# Patient Record
Sex: FEMALE | Race: WHITE | Employment: FULL TIME | URBAN - METROPOLITAN AREA
[De-identification: names, ages, dates, MRNs, and addresses within clinical notes are randomized per-mention and may not be internally consistent; named-entity substitution may affect disease eponyms.]

---

## 2017-08-22 ENCOUNTER — HOSPITAL ENCOUNTER (OUTPATIENT)
Dept: MAMMOGRAPHY | Age: 52
Discharge: HOME | End: 2017-08-22
Attending: OBSTETRICS & GYNECOLOGY
Payer: COMMERCIAL

## 2017-08-22 DIAGNOSIS — Z12.39 SCREENING FOR MALIGNANT NEOPLASM OF BREAST: ICD-10-CM

## 2017-08-22 PROCEDURE — 77067 SCR MAMMO BI INCL CAD: CPT

## 2017-08-29 ENCOUNTER — HOSPITAL ENCOUNTER (OUTPATIENT)
Dept: MAMMOGRAPHY | Age: 52
Discharge: HOME | End: 2017-08-29
Attending: OBSTETRICS & GYNECOLOGY
Payer: COMMERCIAL

## 2017-08-29 DIAGNOSIS — R92.8 ABNORMAL MAMMOGRAM: ICD-10-CM

## 2017-08-29 PROCEDURE — 76642 ULTRASOUND BREAST LIMITED: CPT

## 2019-05-22 ENCOUNTER — HOSPITAL ENCOUNTER (OUTPATIENT)
Dept: MAMMOGRAPHY | Age: 54
Discharge: HOME | End: 2019-05-22
Attending: OBSTETRICS & GYNECOLOGY
Payer: COMMERCIAL

## 2019-05-22 DIAGNOSIS — Z12.31 VISIT FOR SCREENING MAMMOGRAM: ICD-10-CM

## 2019-05-22 PROCEDURE — 77063 BREAST TOMOSYNTHESIS BI: CPT

## 2024-07-09 ENCOUNTER — HOSPITAL ENCOUNTER (INPATIENT)
Age: 59
LOS: 1 days | Discharge: ANOTHER HOSPITAL | DRG: 862 | End: 2024-07-09
Attending: STUDENT IN AN ORGANIZED HEALTH CARE EDUCATION/TRAINING PROGRAM | Admitting: HOSPITALIST
Payer: COMMERCIAL

## 2024-07-09 ENCOUNTER — APPOINTMENT (OUTPATIENT)
Dept: CT IMAGING | Age: 59
DRG: 862 | End: 2024-07-09
Payer: COMMERCIAL

## 2024-07-09 VITALS
TEMPERATURE: 98.2 F | HEIGHT: 61 IN | HEART RATE: 84 BPM | OXYGEN SATURATION: 98 % | DIASTOLIC BLOOD PRESSURE: 86 MMHG | RESPIRATION RATE: 16 BRPM | BODY MASS INDEX: 27.94 KG/M2 | WEIGHT: 148 LBS | SYSTOLIC BLOOD PRESSURE: 127 MMHG

## 2024-07-09 DIAGNOSIS — T81.44XA SEPSIS FOLLOWING PROCEDURE, INITIAL ENCOUNTER (HCC): ICD-10-CM

## 2024-07-09 DIAGNOSIS — A41.9 SEPTICEMIA (HCC): Primary | ICD-10-CM

## 2024-07-09 PROBLEM — T81.49XA POSTOPERATIVE ABSCESS: Status: ACTIVE | Noted: 2024-07-09

## 2024-07-09 PROBLEM — K35.33 APPENDICULAR ABSCESS: Status: RESOLVED | Noted: 2024-07-09 | Resolved: 2024-07-09

## 2024-07-09 PROBLEM — K35.33 APPENDICULAR ABSCESS: Status: ACTIVE | Noted: 2024-07-09

## 2024-07-09 LAB
ALBUMIN SERPL-MCNC: 3.6 G/DL (ref 3.5–5)
ALBUMIN/GLOB SERPL: 1 (ref 1–1.9)
ALP SERPL-CCNC: 130 U/L (ref 35–104)
ALT SERPL-CCNC: 19 U/L (ref 12–65)
ANION GAP SERPL CALC-SCNC: 19 MMOL/L (ref 9–18)
AST SERPL-CCNC: 18 U/L (ref 15–37)
BASOPHILS # BLD: 0.1 K/UL (ref 0–0.2)
BASOPHILS NFR BLD: 0 % (ref 0–2)
BILIRUB SERPL-MCNC: 0.7 MG/DL (ref 0–1.2)
BILIRUB UR QL: NEGATIVE
BUN SERPL-MCNC: 10 MG/DL (ref 6–23)
CALCIUM SERPL-MCNC: 9.4 MG/DL (ref 8.8–10.2)
CHLORIDE SERPL-SCNC: 98 MMOL/L (ref 98–107)
CO2 SERPL-SCNC: 21 MMOL/L (ref 20–28)
CREAT SERPL-MCNC: 0.65 MG/DL (ref 0.6–1.1)
DIFFERENTIAL METHOD BLD: ABNORMAL
EOSINOPHIL # BLD: 0.1 K/UL (ref 0–0.8)
EOSINOPHIL NFR BLD: 1 % (ref 0.5–7.8)
ERYTHROCYTE [DISTWIDTH] IN BLOOD BY AUTOMATED COUNT: 11.8 % (ref 11.9–14.6)
GLOBULIN SER CALC-MCNC: 3.7 G/DL (ref 2.3–3.5)
GLUCOSE SERPL-MCNC: 89 MG/DL (ref 70–99)
GLUCOSE UR QL STRIP.AUTO: NEGATIVE MG/DL
HCT VFR BLD AUTO: 37.8 % (ref 35.8–46.3)
HGB BLD-MCNC: 13.1 G/DL (ref 11.7–15.4)
IMM GRANULOCYTES # BLD AUTO: 0.1 K/UL (ref 0–0.5)
IMM GRANULOCYTES NFR BLD AUTO: 1 % (ref 0–5)
KETONES UR-MCNC: 40 MG/DL
LACTATE SERPL-SCNC: 0.9 MMOL/L (ref 0.5–2)
LEUKOCYTE ESTERASE UR QL STRIP: NEGATIVE
LYMPHOCYTES # BLD: 1.6 K/UL (ref 0.5–4.6)
LYMPHOCYTES NFR BLD: 11 % (ref 13–44)
MCH RBC QN AUTO: 29 PG (ref 26.1–32.9)
MCHC RBC AUTO-ENTMCNC: 34.7 G/DL (ref 31.4–35)
MCV RBC AUTO: 83.8 FL (ref 82–102)
MONOCYTES # BLD: 0.8 K/UL (ref 0.1–1.3)
MONOCYTES NFR BLD: 5 % (ref 4–12)
NEUTS SEG # BLD: 11.9 K/UL (ref 1.7–8.2)
NEUTS SEG NFR BLD: 82 % (ref 43–78)
NITRITE UR QL: NEGATIVE
NRBC # BLD: 0 K/UL (ref 0–0.2)
PH UR: 6 (ref 5–9)
PLATELET # BLD AUTO: 437 K/UL (ref 150–450)
PMV BLD AUTO: 8.9 FL (ref 9.4–12.3)
POTASSIUM SERPL-SCNC: 3.9 MMOL/L (ref 3.5–5.1)
PROCALCITONIN SERPL-MCNC: 0.06 NG/ML (ref 0–0.1)
PROT SERPL-MCNC: 7.4 G/DL (ref 6.3–8.2)
PROT UR QL: NEGATIVE MG/DL
RBC # BLD AUTO: 4.51 M/UL (ref 4.05–5.2)
RBC # UR STRIP: ABNORMAL
SERVICE CMNT-IMP: ABNORMAL
SODIUM SERPL-SCNC: 138 MMOL/L (ref 136–145)
SP GR UR: 1.01 (ref 1–1.02)
UROBILINOGEN UR QL: 0.2 EU/DL (ref 0.2–1)
WBC # BLD AUTO: 14.5 K/UL (ref 4.3–11.1)

## 2024-07-09 PROCEDURE — 83605 ASSAY OF LACTIC ACID: CPT

## 2024-07-09 PROCEDURE — 99285 EMERGENCY DEPT VISIT HI MDM: CPT

## 2024-07-09 PROCEDURE — 96365 THER/PROPH/DIAG IV INF INIT: CPT

## 2024-07-09 PROCEDURE — 87040 BLOOD CULTURE FOR BACTERIA: CPT

## 2024-07-09 PROCEDURE — 74177 CT ABD & PELVIS W/CONTRAST: CPT

## 2024-07-09 PROCEDURE — 6360000004 HC RX CONTRAST MEDICATION

## 2024-07-09 PROCEDURE — 80053 COMPREHEN METABOLIC PANEL: CPT

## 2024-07-09 PROCEDURE — 6360000002 HC RX W HCPCS

## 2024-07-09 PROCEDURE — 85025 COMPLETE CBC W/AUTO DIFF WBC: CPT

## 2024-07-09 PROCEDURE — 84145 PROCALCITONIN (PCT): CPT

## 2024-07-09 PROCEDURE — 81003 URINALYSIS AUTO W/O SCOPE: CPT

## 2024-07-09 PROCEDURE — 96366 THER/PROPH/DIAG IV INF ADDON: CPT

## 2024-07-09 PROCEDURE — 1100000000 HC RM PRIVATE

## 2024-07-09 PROCEDURE — 2580000003 HC RX 258

## 2024-07-09 RX ORDER — SODIUM CHLORIDE 9 MG/ML
INJECTION, SOLUTION INTRAVENOUS CONTINUOUS
Status: DISCONTINUED | OUTPATIENT
Start: 2024-07-09 | End: 2024-07-10

## 2024-07-09 RX ORDER — ACETAMINOPHEN 325 MG/1
650 TABLET ORAL EVERY 6 HOURS PRN
Status: DISCONTINUED | OUTPATIENT
Start: 2024-07-09 | End: 2024-07-16 | Stop reason: HOSPADM

## 2024-07-09 RX ORDER — METRONIDAZOLE 500 MG/100ML
500 INJECTION, SOLUTION INTRAVENOUS EVERY 8 HOURS
Status: DISCONTINUED | OUTPATIENT
Start: 2024-07-09 | End: 2024-07-16 | Stop reason: HOSPADM

## 2024-07-09 RX ORDER — SODIUM CHLORIDE 9 MG/ML
INJECTION, SOLUTION INTRAVENOUS PRN
Status: DISCONTINUED | OUTPATIENT
Start: 2024-07-09 | End: 2024-07-16 | Stop reason: HOSPADM

## 2024-07-09 RX ORDER — POLYETHYLENE GLYCOL 3350 17 G/17G
17 POWDER, FOR SOLUTION ORAL DAILY PRN
Status: DISCONTINUED | OUTPATIENT
Start: 2024-07-09 | End: 2024-07-16 | Stop reason: HOSPADM

## 2024-07-09 RX ORDER — POTASSIUM CHLORIDE 20 MEQ/1
40 TABLET, EXTENDED RELEASE ORAL PRN
Status: DISCONTINUED | OUTPATIENT
Start: 2024-07-09 | End: 2024-07-16 | Stop reason: HOSPADM

## 2024-07-09 RX ORDER — ONDANSETRON 4 MG/1
TABLET, FILM COATED ORAL
Status: ON HOLD | COMMUNITY
Start: 2024-06-27

## 2024-07-09 RX ORDER — SODIUM CHLORIDE 0.9 % (FLUSH) 0.9 %
5-40 SYRINGE (ML) INJECTION EVERY 12 HOURS SCHEDULED
Status: DISCONTINUED | OUTPATIENT
Start: 2024-07-09 | End: 2024-07-16 | Stop reason: HOSPADM

## 2024-07-09 RX ORDER — ONDANSETRON 2 MG/ML
4 INJECTION INTRAMUSCULAR; INTRAVENOUS EVERY 6 HOURS PRN
Status: DISCONTINUED | OUTPATIENT
Start: 2024-07-09 | End: 2024-07-16 | Stop reason: HOSPADM

## 2024-07-09 RX ORDER — ONDANSETRON 4 MG/1
4 TABLET, ORALLY DISINTEGRATING ORAL EVERY 8 HOURS PRN
Status: DISCONTINUED | OUTPATIENT
Start: 2024-07-09 | End: 2024-07-16 | Stop reason: HOSPADM

## 2024-07-09 RX ORDER — ACETAMINOPHEN 650 MG/1
650 SUPPOSITORY RECTAL EVERY 6 HOURS PRN
Status: DISCONTINUED | OUTPATIENT
Start: 2024-07-09 | End: 2024-07-16 | Stop reason: HOSPADM

## 2024-07-09 RX ORDER — MORPHINE SULFATE 4 MG/ML
4 INJECTION, SOLUTION INTRAMUSCULAR; INTRAVENOUS
Status: DISCONTINUED | OUTPATIENT
Start: 2024-07-09 | End: 2024-07-16 | Stop reason: HOSPADM

## 2024-07-09 RX ORDER — HYDROCODONE BITARTRATE AND ACETAMINOPHEN 7.5; 325 MG/1; MG/1
TABLET ORAL
Status: ON HOLD | COMMUNITY
Start: 2024-06-27

## 2024-07-09 RX ORDER — SODIUM CHLORIDE 0.9 % (FLUSH) 0.9 %
5-40 SYRINGE (ML) INJECTION PRN
Status: DISCONTINUED | OUTPATIENT
Start: 2024-07-09 | End: 2024-07-16 | Stop reason: HOSPADM

## 2024-07-09 RX ORDER — POTASSIUM CHLORIDE 7.45 MG/ML
10 INJECTION INTRAVENOUS PRN
Status: DISCONTINUED | OUTPATIENT
Start: 2024-07-09 | End: 2024-07-16 | Stop reason: HOSPADM

## 2024-07-09 RX ORDER — MAGNESIUM SULFATE IN WATER 40 MG/ML
2000 INJECTION, SOLUTION INTRAVENOUS PRN
Status: DISCONTINUED | OUTPATIENT
Start: 2024-07-09 | End: 2024-07-16 | Stop reason: HOSPADM

## 2024-07-09 RX ADMIN — VANCOMYCIN HYDROCHLORIDE 1750 MG: 10 INJECTION, POWDER, LYOPHILIZED, FOR SOLUTION INTRAVENOUS at 21:10

## 2024-07-09 RX ADMIN — IOPAMIDOL 100 ML: 755 INJECTION, SOLUTION INTRAVENOUS at 19:13

## 2024-07-09 RX ADMIN — CEFEPIME 2000 MG: 2 INJECTION, POWDER, FOR SOLUTION INTRAVENOUS at 20:56

## 2024-07-09 ASSESSMENT — LIFESTYLE VARIABLES
HOW MANY STANDARD DRINKS CONTAINING ALCOHOL DO YOU HAVE ON A TYPICAL DAY: PATIENT DOES NOT DRINK
HOW OFTEN DO YOU HAVE A DRINK CONTAINING ALCOHOL: NEVER

## 2024-07-09 ASSESSMENT — PAIN DESCRIPTION - ORIENTATION: ORIENTATION: RIGHT

## 2024-07-09 ASSESSMENT — PAIN SCALES - GENERAL: PAINLEVEL_OUTOF10: 6

## 2024-07-09 ASSESSMENT — PAIN DESCRIPTION - PAIN TYPE: TYPE: ACUTE PAIN

## 2024-07-09 ASSESSMENT — PAIN DESCRIPTION - LOCATION: LOCATION: ABDOMEN

## 2024-07-09 ASSESSMENT — PAIN DESCRIPTION - FREQUENCY: FREQUENCY: CONTINUOUS

## 2024-07-09 ASSESSMENT — PAIN - FUNCTIONAL ASSESSMENT: PAIN_FUNCTIONAL_ASSESSMENT: 0-10

## 2024-07-10 ENCOUNTER — HOSPITAL ENCOUNTER (INPATIENT)
Age: 59
LOS: 6 days | Discharge: HOME | DRG: 856 | End: 2024-07-16
Attending: HOSPITALIST | Admitting: HOSPITALIST
Payer: COMMERCIAL

## 2024-07-10 ENCOUNTER — ANESTHESIA EVENT (OUTPATIENT)
Dept: SURGERY | Age: 59
End: 2024-07-10
Payer: COMMERCIAL

## 2024-07-10 ENCOUNTER — PREP FOR PROCEDURE (OUTPATIENT)
Dept: SURGERY | Age: 59
End: 2024-07-10

## 2024-07-10 DIAGNOSIS — A41.9 SEPTICEMIA (HCC): ICD-10-CM

## 2024-07-10 PROCEDURE — 2580000003 HC RX 258: Performed by: HOSPITALIST

## 2024-07-10 PROCEDURE — 6360000002 HC RX W HCPCS: Performed by: SURGERY

## 2024-07-10 PROCEDURE — 6360000002 HC RX W HCPCS: Performed by: HOSPITALIST

## 2024-07-10 PROCEDURE — 1100000000 HC RM PRIVATE

## 2024-07-10 PROCEDURE — 99253 IP/OBS CNSLTJ NEW/EST LOW 45: CPT | Performed by: PHYSICIAN ASSISTANT

## 2024-07-10 PROCEDURE — 6370000000 HC RX 637 (ALT 250 FOR IP): Performed by: HOSPITALIST

## 2024-07-10 PROCEDURE — 87641 MR-STAPH DNA AMP PROBE: CPT

## 2024-07-10 RX ORDER — SODIUM CHLORIDE AND POTASSIUM CHLORIDE 150; 900 MG/100ML; MG/100ML
INJECTION, SOLUTION INTRAVENOUS CONTINUOUS
Status: DISCONTINUED | OUTPATIENT
Start: 2024-07-10 | End: 2024-07-15

## 2024-07-10 RX ADMIN — ACETAMINOPHEN 650 MG: 325 TABLET ORAL at 16:10

## 2024-07-10 RX ADMIN — METRONIDAZOLE 500 MG: 500 INJECTION, SOLUTION INTRAVENOUS at 23:08

## 2024-07-10 RX ADMIN — CEFEPIME 2000 MG: 2 INJECTION, POWDER, FOR SOLUTION INTRAVENOUS at 09:06

## 2024-07-10 RX ADMIN — VANCOMYCIN HYDROCHLORIDE 1000 MG: 1 INJECTION, POWDER, LYOPHILIZED, FOR SOLUTION INTRAVENOUS at 09:07

## 2024-07-10 RX ADMIN — CEFEPIME 2000 MG: 2 INJECTION, POWDER, FOR SOLUTION INTRAVENOUS at 20:06

## 2024-07-10 RX ADMIN — ONDANSETRON 4 MG: 2 INJECTION INTRAMUSCULAR; INTRAVENOUS at 17:07

## 2024-07-10 RX ADMIN — METRONIDAZOLE 500 MG: 500 INJECTION, SOLUTION INTRAVENOUS at 07:04

## 2024-07-10 RX ADMIN — METRONIDAZOLE 500 MG: 500 INJECTION, SOLUTION INTRAVENOUS at 00:21

## 2024-07-10 RX ADMIN — SODIUM CHLORIDE: 9 INJECTION, SOLUTION INTRAVENOUS at 00:19

## 2024-07-10 RX ADMIN — SODIUM CHLORIDE, PRESERVATIVE FREE 10 ML: 5 INJECTION INTRAVENOUS at 00:21

## 2024-07-10 RX ADMIN — POTASSIUM CHLORIDE AND SODIUM CHLORIDE: 900; 150 INJECTION, SOLUTION INTRAVENOUS at 23:06

## 2024-07-10 RX ADMIN — SODIUM CHLORIDE: 9 INJECTION, SOLUTION INTRAVENOUS at 14:01

## 2024-07-10 RX ADMIN — SODIUM CHLORIDE, PRESERVATIVE FREE 10 ML: 5 INJECTION INTRAVENOUS at 09:08

## 2024-07-10 RX ADMIN — METRONIDAZOLE 500 MG: 500 INJECTION, SOLUTION INTRAVENOUS at 15:10

## 2024-07-10 RX ADMIN — POTASSIUM CHLORIDE AND SODIUM CHLORIDE: 900; 150 INJECTION, SOLUTION INTRAVENOUS at 13:07

## 2024-07-10 ASSESSMENT — PAIN SCALES - GENERAL
PAINLEVEL_OUTOF10: 0
PAINLEVEL_OUTOF10: 0

## 2024-07-10 NOTE — CONSULTS
CONSULT                      Date: 7/10/2024        Patient Name: Rama Barboza     YOB: 1965      Age:  58 y.o.      History of Present Illness     58 y.o.   female  with no major medical problems presented to emergency room with abdominal pain, low-grade fevers going on for past 12 days since patient got appendectomy.  Abdominal pain is right lower quadrant, denies any radiation.  Patient reports decreased p.o. intake, lack of appetite for the past 2 weeks.  [facility] since surgery patient not completely recovered.  Reports there was a rash few days ago.  Which improved.  Laparoscopic surgical wounds are almost healed.  CT scan of abdomen was obtained, CT scan shows evidence of large abscess around 10 cm around the surgical clip.  Initially Dr. 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  ER physician contacted me to request to admit and surgeon to evaluate.    Urology consulted for right hydronephrosis. Pt not a candidate for IR drain placement but planning for laparoscopic drainage with gen surgery tomorrow. Denies flank pain.    Past Medical History     Past Medical History:   Diagnosis Date    Chronic pain     back    Fibrocystic breast         Past Surgical History     Past Surgical History:   Procedure Laterality Date    BREAST BIOPSY      BREAST LUMPECTOMY  1990?    right breast    HYSTERECTOMY (CERVIX STATUS UNKNOWN)  5/2009    Supracervical    NEUROLOGICAL SURGERY      laminectomy        Medications Prior to Admission     Prior to Admission medications    Medication Sig Start Date End Date Taking? Authorizing Provider   HYDROcodone-acetaminophen (NORCO) 7.5-325 MG per tablet TAKE 1 TAB EVERY 4 HOURS AS NEEDED FOR PAIN 6/27/24   Provider, MD Fredrick   ondansetron 
No intervention per Dr. Stefan Victor-\"patient will get an opearation\".  
[facility]  Department of Interventional Radiology  [phone]      Consult Note--No Charge     Patient: Rama Barboza MRN: [id number]  SSN: [id number]    YOB: 1965  Age: 58 y.o.  Sex: female      Referring Health Care Worker:  Dr Hathaway    Consult Date: 7/10/2024        I was asked to review the CT scan on this patient for possible percutaneous drain placement. Pertinent history includes laparoscopic appendectomy about 13 days prior.      CT 7/9/24 compared to pre-operative CT 6/26/24.      There is a multiloculated abscess deep in the right pelvis, adjacent to the staple line on the cecum.  Unfortunately, there is no safe, percutaneous route, for drain placement.  I asked my partner, Dr. Morfin to evaluate and he agrees.      I have updated Dr. Hathaway.      CLAU BAEZA MD              Past Medical History:   Diagnosis Date    Chronic pain     back    Fibrocystic breast      Past Surgical History:   Procedure Laterality Date    BREAST BIOPSY      BREAST LUMPECTOMY  1990?    right breast    HYSTERECTOMY (CERVIX STATUS UNKNOWN)  5/2009    Supracervical    NEUROLOGICAL SURGERY      laminectomy      Family History   Problem Relation Age of Onset    Parkinson's Disease Father     Hypertension Father     Gall Bladder Disease Father     Hypertension Mother     Diabetes Mother     Lupus Mother     Breast Cancer Neg Hx     Heart Attack Maternal Grandfather      Social History     Tobacco Use    Smoking status: Never    Smokeless tobacco: Never   Substance Use Topics    Alcohol use: No     Alcohol/week: 1.7 standard drinks of alcohol      Allergies   Allergen Reactions    Penicillins Other (See Comments) and Rash     Since infancy, pt unsure of reaction; Patient states, \" I have taken Keflex without any problem.\"     Current Facility-Administered Medications   Medication Dose Route Frequency Provider Last Rate Last Admin    vancomycin (VANCOCIN) 1,000 mg in 
There is no postoperative ileus or  obstruction.  - LYMPH NODES: No significant retroperitoneal, mesenteric, or pelvic adenopathy.  - BONES: No fracture or significant bone lesion. Degenerative disc disease is  noted at L4-5.  - VASCULATURE: Normal  - OTHER: No ascites.    - LUNG BASES: No infiltrates or masses.    Impression  1.  Phlegmonous changes with developing abscess are noted in the right lower  quadrant, surrounding the surgical clips. The collection measures 7 x 6 x 10 cm.  2.   The distal right ureter is engulfed by the collection. There is resultant  mild right hydronephrosis.  3.  There are bowel loops draped along the medial margin of the  phlegmon/abscess.      If there are any questions about this report, I can be reached on [employer]  or at [phone]      Electronically signed by ILIA GREY    US Result (most recent):  US NON OB TRANSVAGINAL     Impression  Gyn Report [facility]  Patient / Exam Information Date of Exam: 2019  Name: Jabari Ontiveros Perf. Phys: Dr. Ellen Dominguez MD  Patient ID: [id number] : 1965 Ref. Phys:  Age: 53 Sonographer: Alisha Welch RDMS  Indication: rt pain Sex: Female Exam Type: TV GYN  LMP  LMP Day of Cycle  AB  Day of stim. Expected Ovul. Para Ectopic  2D Measurements Value m1 m2 m3 m4 m5 m6 Meth.  Left Ovary  Length 1.98 cm 1.98 avg.  Width 1.68 cm 1.68 avg.  Height 1.07 cm 1.07 avg.  Volume 1.864 cm³ 1.864  Right Ovary  Length 2.29 cm 2.29 avg.  Width 2.41 cm 2.41 avg.  Height 1.50 cm 1.50 avg.  Volume 4.335 cm³ 4.335  Rt Mass 1 1.55 cm 1.49 1.46 1.70 avg.  Comment  [id number]---lepzgt pain  Surgically absent uterus  cervix is seen and filled with simple fluid. There is several peripherial tissue projections seen within the fluid that  appears to extend from the wall. Question polyps vs folds of normal cervical tissue.  Rt ovary has simple follicle/cyst that measures 1.5/1.5/1.7cm  lt ovary on seen abdominally and appears

## 2024-07-10 NOTE — H&P
Code      Non-peripheral Lines and Tubes (if present):             Hospital Problems:  Principal Problem:    Appendicular abscess  Resolved Problems:    * No resolved hospital problems. *        Objective:   No data found.         Estimated body mass index is 27.96 kg/m² as calculated from the following:    Height as of an earlier encounter on 7/9/24: 1.549 m (5' 1\").    Weight as of an earlier encounter on 7/9/24: 67.1 kg (148 lb).  No intake or output data in the 24 hours ending 07/09/24 1917      Physical Exam:    General:    Well nourished.    Head:  Normocephalic, atraumatic  Eyes:  Sclerae appear normal.  Pupils equally round.  ENT:  Nares appear normal.  Moist oral mucosa  Neck:  No restricted ROM.  Trachea midline   CV:   RRR.  No m/r/g.  No jugular venous distension.  Lungs:   CTAB.  No wheezing, rhonchi, or rales.  Symmetric expansion.  Abdomen:   Soft, Slight tenderness over right lower quadrant and suprapubic area.  Extremities: No cyanosis or clubbing.  No edema  Skin:     No rashes.  Normal coloration.   Warm and dry.    Neuro:  Alert oriented x 3 s  Psych:  Normal mood and affect.      Orders Placed This Encounter   Medications    sodium chloride flush 0.9 % injection 5-40 mL    sodium chloride flush 0.9 % injection 5-40 mL    0.9 % sodium chloride infusion    OR Linked Order Group     potassium chloride (KLOR-CON M) extended release tablet 40 mEq     potassium bicarb-citric acid (EFFER-K) effervescent tablet 40 mEq     potassium chloride 10 mEq/100 mL IVPB (Peripheral Line)    magnesium sulfate 2000 mg in 50 mL IVPB premix    OR Linked Order Group     ondansetron (ZOFRAN-ODT) disintegrating tablet 4 mg     ondansetron (ZOFRAN) injection 4 mg    polyethylene glycol (GLYCOLAX) packet 17 g    OR Linked Order Group     acetaminophen (TYLENOL) tablet 650 mg     acetaminophen (TYLENOL) suppository 650 mg    0.9 % sodium chloride infusion    morphine sulfate (PF) injection 4 mg    metroNIDAZOLE (FLAGYL) 500

## 2024-07-10 NOTE — ED PROVIDER NOTES
Emergency Department Provider Note       PCP: Sommer Lopez MD   Age: 58 y.o.   Sex: female     DISPOSITION Decision To Admit 07/09/2024 10:02:02 PM       ICD-10-CM    1. Septicemia ([facility])  A41.9       2. Sepsis following procedure, initial encounter ([facility])  T81.44XA           Medical Decision Making     Patient arrives with stable vital signs in no apparent distress.  Exam with focal tenderness to the right lower quadrant with guarding.  Concern for postoperative abscess.  Will proceed with labs and imaging.    Labs demonstrate moderate leukocytosis of 14,000, otherwise within normal limits.  Sepsis markers negative.  CT demonstrates fairly large abscess of approximately 7 x 10 cm to the right lower quadrant.  I had originally discussed the case with general surgery who have deferred admission given the patient's surgery was performed at an outside facility.  I will consult hospitalist for admission to potentially consult IR.  Care coordinated with attending.  ED Course as of 07/09/24 2219 Tue Jul 09, 2024   1845 WBC(!): 14.5 [LB]      ED Course User Index  [LB] Quoc Escoto PA     1 or more acute illnesses that pose a threat to life or bodily function.       I independently ordered and reviewed each unique test.  I reviewed external records: ED visit note from an outside group.     I interpreted the CT Scan large abscess to right lower quadrant.    The patient was admitted and I have discussed patient management with the admitting provider.  The management of this patient was discussed with an external consultant.      Exclusion criteria - the patient is NOT to be included for SEP-1 Core Measure due to: May have criteria for sepsis, but does not meet criteria for severe sepsis or septic shock       History     58-year-old female presents to the emergency department with concern for persistent abdominal pain and intermittent fevers.  Patient reports that she had an appendectomy performed at an

## 2024-07-10 NOTE — CARE COORDINATION
CM spoke to patient at bedside on this day. Patient confirmed demographic information. Patient reports that she has [employer] insurance. Patient reports that she lives with her spouse, in a 1 story house, and has 1 step to enter home. Patient reports that she is independent with ADLs, uses no DMEs, and is an active . Patient reports that she has no home care services. Patient confirmed PCP information and last PCP visit was a week ago. Patient is agreeable to return home when medically stable for discharge and patient stated that a family member will transport her home at the appropriate time.     No CM needs voiced or noted.        07/10/24 4250   Service Assessment   Patient Orientation Alert and Oriented   Cognition Alert   History Provided By Patient   Support Systems Spouse/Significant Other   Patient's Healthcare Decision Maker is: Legal Next of Kin   PCP Verified by CM Yes  (confirmed PCP is Dr. Sommer Lopez)   Last Visit to PCP Within last 3 months  (last PCP visit was a week ago)   Prior Functional Level Independent in ADLs/IADLs   Current Functional Level Other (see comment)  (TBD by clinicals)   Can patient return to prior living arrangement Yes   Ability to make needs known: Good   Family able to assist with home care needs: Yes   Would you like for me to discuss the discharge plan with any other family members/significant others, and if so, who? Yes   Financial Resources Other (Comment)  (Patient states she has [employer] Insurance.)   Community Resources None   CM/SW Referral Other (see comment)  (discharge planning)   Social/Functional History   Lives With Spouse   Type of Home House   Home Layout One level   Home Access Stairs to enter without rails   Entrance Stairs - Number of Steps 1 step   Bathroom Shower/Tub Walk-in shower   Bathroom Toilet Standard   Bathroom Equipment None   Bathroom Accessibility Accessible   Home Equipment None   Receives Help From Family   ADL

## 2024-07-10 NOTE — PLAN OF CARE
Problem: Discharge Planning  Goal: Discharge to home or other facility with appropriate resources  7/10/2024 1018 by Amanda Pritchard, RN  Outcome: Progressing  7/10/2024 0038 by Clarice Gary RN  Outcome: Progressing     Problem: Pain  Goal: Verbalizes/displays adequate comfort level or baseline comfort level  7/10/2024 1018 by Amanda Pritchard RN  Outcome: Progressing  7/10/2024 0038 by Clarice Gary RN  Outcome: Progressing     Problem: Safety - Adult  Goal: Free from fall injury  Outcome: Progressing     Problem: Nutrition Deficit:  Goal: Optimize nutritional status  Outcome: Progressing

## 2024-07-10 NOTE — ANESTHESIA PRE PROCEDURE
= 3 FB   Dental:          Pulmonary:Negative Pulmonary ROS and normal exam                               Cardiovascular:Negative CV ROS  Exercise tolerance: good (>4 METS)                    Neuro/Psych:   Negative Neuro/Psych ROS              GI/Hepatic/Renal:            ROS comment: appendiceal abscess.   Endo/Other: Negative Endo/Other ROS                    Abdominal:             Vascular: negative vascular ROS.         Other Findings:         Anesthesia Plan      general     ASA 1       Induction: intravenous.      Anesthetic plan and risks discussed with patient and spouse.                      CURTIS MORALEZ JR, MD   7/10/2024

## 2024-07-10 NOTE — ED NOTES
TRANSFER - OUT REPORT:    Verbal report given to PRAVIN Oneil on Rama Barboza  being transferred to [facility] for routine progression of patient care       Report consisted of patient's Situation, Background, Assessment and   Recommendations(SBAR).     Information from the following report(s) ED SBAR, MAR, and Recent Results was reviewed with the receiving nurse.    Lines:   Peripheral IV 07/09/24 Right Antecubital (Active)   Site Assessment Clean, dry & intact 07/09/24 1818   Line Status Brisk blood return;Flushed;Specimen collected;Normal saline locked 07/09/24 1818   Line Care Cap changed;Connections checked and tightened;Chlorhexidine wipes;Line pulled back;Ports disinfected;Tubing changed 07/09/24 1818   Phlebitis Assessment No symptoms 07/09/24 1818   Infiltration Assessment 0 07/09/24 1818   Alcohol Cap Used No 07/09/24 1818   Dressing Status New dressing applied;Clean, dry & intact 07/09/24 1818   Dressing Type Transparent 07/09/24 1818   Dressing Intervention New;Dressing changed 07/09/24 1818       Peripheral IV 07/09/24 Left Antecubital (Active)   Site Assessment Clean, dry & intact 07/09/24 2051   Line Status Brisk blood return;Flushed;Specimen collected;Normal saline locked 07/09/24 2051   Line Care Cap changed;Connections checked and tightened;Chlorhexidine wipes;Line pulled back;Ports disinfected;Tubing changed 07/09/24 2051   Phlebitis Assessment No symptoms 07/09/24 2051   Infiltration Assessment 0 07/09/24 2051   Alcohol Cap Used No 07/09/24 2051   Dressing Status New dressing applied;Clean, dry & intact 07/09/24 2051   Dressing Type Transparent 07/09/24 2051   Dressing Intervention New;Dressing changed 07/09/24 2051        Opportunity for questions and clarification was provided.      [facility]

## 2024-07-10 NOTE — H&P
Hospitalist History and Physical   Admit Date:  2024  6:02 PM   Name:  Rama Barboza   Age:  58 y.o.  Sex:  female  :  1965   MRN:  [id number]   Room:  /    Presenting/Chief Complaint: Fever and Fatigue     Reason(s) for Admission: Appendicular abscess [K35.33]     History of Present Illness:     58 years old with no major medical problems presented to emergency room with abdominal pain, low-grade fevers going on for past 12 days since patient got appendectomy.  Abdominal pain is right lower quadrant, denies any radiation.  Patient reports decreased p.o. intake, lack of appetite for the past 2 weeks.  [facility] since surgery patient not completely recovered.  Reports there was a rash few days ago.  Which improved.  Laparoscopic surgical wounds are almost healed.  CT scan of abdomen was obtained, CT scan shows evidence of large abscess around 10 cm around the surgical clip.  Initially Dr. 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  ER physician contacted me to request to admit and surgeon to evaluate.                 Assessment & Plan:      Intra-abdominal abscess status post appendectomy: Will request general surgery evaluation, IR evaluation.  Initiated on antibiotics with cefepime, metronidazole, vancomycin for broader coverage.  Patient also have mild right-sided hydroureter, will request consultation with urology.  I explained my role to patient as a medical doctor to give antibiotics and coordinate the care depending on specialist impression.  I also informed patient that I am not a qualified surgical specialist do procedure, she agreed to talk to surgeon to decide further plan.          Diet: Diet NPO  VTE prophylaxis: SCD's   Code status: Full Code      Non-peripheral

## 2024-07-11 ENCOUNTER — ANESTHESIA (OUTPATIENT)
Dept: SURGERY | Age: 59
End: 2024-07-11
Payer: COMMERCIAL

## 2024-07-11 LAB
MRSA DNA SPEC QL NAA+PROBE: NOT DETECTED
S AUREUS CPE NOSE QL NAA+PROBE: NOT DETECTED

## 2024-07-11 PROCEDURE — 6360000002 HC RX W HCPCS: Performed by: NURSE ANESTHETIST, CERTIFIED REGISTERED

## 2024-07-11 PROCEDURE — 0W9G0ZZ DRAINAGE OF PERITONEAL CAVITY, OPEN APPROACH: ICD-10-PCS | Performed by: SURGERY

## 2024-07-11 PROCEDURE — 1100000000 HC RM PRIVATE

## 2024-07-11 PROCEDURE — 6360000002 HC RX W HCPCS: Performed by: HOSPITALIST

## 2024-07-11 PROCEDURE — 87206 SMEAR FLUORESCENT/ACID STAI: CPT

## 2024-07-11 PROCEDURE — 3600000004 HC SURGERY LEVEL 4 BASE: Performed by: SURGERY

## 2024-07-11 PROCEDURE — 3700000001 HC ADD 15 MINUTES (ANESTHESIA): Performed by: SURGERY

## 2024-07-11 PROCEDURE — 87186 SC STD MICRODIL/AGAR DIL: CPT

## 2024-07-11 PROCEDURE — 3700000000 HC ANESTHESIA ATTENDED CARE: Performed by: SURGERY

## 2024-07-11 PROCEDURE — 2580000003 HC RX 258: Performed by: HOSPITALIST

## 2024-07-11 PROCEDURE — 2500000003 HC RX 250 WO HCPCS: Performed by: NURSE ANESTHETIST, CERTIFIED REGISTERED

## 2024-07-11 PROCEDURE — 87205 SMEAR GRAM STAIN: CPT

## 2024-07-11 PROCEDURE — 2580000003 HC RX 258: Performed by: SURGERY

## 2024-07-11 PROCEDURE — 7100000001 HC PACU RECOVERY - ADDTL 15 MIN: Performed by: SURGERY

## 2024-07-11 PROCEDURE — 2580000003 HC RX 258: Performed by: NURSE ANESTHETIST, CERTIFIED REGISTERED

## 2024-07-11 PROCEDURE — 0DJ64ZZ INSPECTION OF STOMACH, PERCUTANEOUS ENDOSCOPIC APPROACH: ICD-10-PCS | Performed by: SURGERY

## 2024-07-11 PROCEDURE — 64486 TAP BLOCK UNIL BY INJECTION: CPT | Performed by: ANESTHESIOLOGY

## 2024-07-11 PROCEDURE — 6360000002 HC RX W HCPCS: Performed by: ANESTHESIOLOGY

## 2024-07-11 PROCEDURE — 87102 FUNGUS ISOLATION CULTURE: CPT

## 2024-07-11 PROCEDURE — 87077 CULTURE AEROBIC IDENTIFY: CPT

## 2024-07-11 PROCEDURE — 2580000003 HC RX 258: Performed by: ANESTHESIOLOGY

## 2024-07-11 PROCEDURE — 2700000000 HC OXYGEN THERAPY PER DAY

## 2024-07-11 PROCEDURE — 3600000014 HC SURGERY LEVEL 4 ADDTL 15MIN: Performed by: SURGERY

## 2024-07-11 PROCEDURE — 87075 CULTR BACTERIA EXCEPT BLOOD: CPT

## 2024-07-11 PROCEDURE — 2709999900 HC NON-CHARGEABLE SUPPLY: Performed by: SURGERY

## 2024-07-11 PROCEDURE — 87076 CULTURE ANAEROBE IDENT EACH: CPT

## 2024-07-11 PROCEDURE — 7100000000 HC PACU RECOVERY - FIRST 15 MIN: Performed by: SURGERY

## 2024-07-11 PROCEDURE — 6360000002 HC RX W HCPCS: Performed by: SURGERY

## 2024-07-11 PROCEDURE — 3E0T3BZ INTRODUCTION OF ANESTHETIC AGENT INTO PERIPHERAL NERVES AND PLEXI, PERCUTANEOUS APPROACH: ICD-10-PCS | Performed by: ANESTHESIOLOGY

## 2024-07-11 PROCEDURE — 87070 CULTURE OTHR SPECIMN AEROBIC: CPT

## 2024-07-11 RX ORDER — LIDOCAINE HYDROCHLORIDE 20 MG/ML
INJECTION, SOLUTION EPIDURAL; INFILTRATION; INTRACAUDAL; PERINEURAL PRN
Status: DISCONTINUED | OUTPATIENT
Start: 2024-07-11 | End: 2024-07-11 | Stop reason: SDUPTHER

## 2024-07-11 RX ORDER — OXYCODONE HYDROCHLORIDE 5 MG/1
5 TABLET ORAL
Status: DISCONTINUED | OUTPATIENT
Start: 2024-07-11 | End: 2024-07-11 | Stop reason: HOSPADM

## 2024-07-11 RX ORDER — FENTANYL CITRATE 50 UG/ML
100 INJECTION, SOLUTION INTRAMUSCULAR; INTRAVENOUS
Status: DISCONTINUED | OUTPATIENT
Start: 2024-07-11 | End: 2024-07-11 | Stop reason: HOSPADM

## 2024-07-11 RX ORDER — FENTANYL CITRATE 50 UG/ML
INJECTION, SOLUTION INTRAMUSCULAR; INTRAVENOUS PRN
Status: DISCONTINUED | OUTPATIENT
Start: 2024-07-11 | End: 2024-07-11 | Stop reason: SDUPTHER

## 2024-07-11 RX ORDER — DEXAMETHASONE SODIUM PHOSPHATE 10 MG/ML
INJECTION, SOLUTION INTRAMUSCULAR; INTRAVENOUS
Status: DISCONTINUED | OUTPATIENT
Start: 2024-07-11 | End: 2024-07-11 | Stop reason: SDUPTHER

## 2024-07-11 RX ORDER — SODIUM CHLORIDE 0.9 % (FLUSH) 0.9 %
5-40 SYRINGE (ML) INJECTION EVERY 12 HOURS SCHEDULED
Status: DISCONTINUED | OUTPATIENT
Start: 2024-07-11 | End: 2024-07-11 | Stop reason: HOSPADM

## 2024-07-11 RX ORDER — SODIUM CHLORIDE, SODIUM LACTATE, POTASSIUM CHLORIDE, CALCIUM CHLORIDE 600; 310; 30; 20 MG/100ML; MG/100ML; MG/100ML; MG/100ML
INJECTION, SOLUTION INTRAVENOUS CONTINUOUS
Status: DISCONTINUED | OUTPATIENT
Start: 2024-07-11 | End: 2024-07-11 | Stop reason: HOSPADM

## 2024-07-11 RX ORDER — SODIUM CHLORIDE 9 MG/ML
INJECTION, SOLUTION INTRAVENOUS PRN
Status: DISCONTINUED | OUTPATIENT
Start: 2024-07-11 | End: 2024-07-11 | Stop reason: HOSPADM

## 2024-07-11 RX ORDER — HYDROMORPHONE HYDROCHLORIDE 1 MG/ML
0.5 INJECTION, SOLUTION INTRAMUSCULAR; INTRAVENOUS; SUBCUTANEOUS
Status: DISCONTINUED | OUTPATIENT
Start: 2024-07-11 | End: 2024-07-16 | Stop reason: HOSPADM

## 2024-07-11 RX ORDER — OXYCODONE HYDROCHLORIDE 5 MG/1
5 TABLET ORAL EVERY 4 HOURS PRN
Status: DISCONTINUED | OUTPATIENT
Start: 2024-07-11 | End: 2024-07-16 | Stop reason: HOSPADM

## 2024-07-11 RX ORDER — GLYCOPYRROLATE 0.2 MG/ML
INJECTION INTRAMUSCULAR; INTRAVENOUS PRN
Status: DISCONTINUED | OUTPATIENT
Start: 2024-07-11 | End: 2024-07-11 | Stop reason: SDUPTHER

## 2024-07-11 RX ORDER — HALOPERIDOL 5 MG/ML
1 INJECTION INTRAMUSCULAR
Status: DISCONTINUED | OUTPATIENT
Start: 2024-07-11 | End: 2024-07-11 | Stop reason: HOSPADM

## 2024-07-11 RX ORDER — NALOXONE HYDROCHLORIDE 0.4 MG/ML
INJECTION, SOLUTION INTRAMUSCULAR; INTRAVENOUS; SUBCUTANEOUS PRN
Status: DISCONTINUED | OUTPATIENT
Start: 2024-07-11 | End: 2024-07-11 | Stop reason: HOSPADM

## 2024-07-11 RX ORDER — MIDAZOLAM HYDROCHLORIDE 2 MG/2ML
2 INJECTION, SOLUTION INTRAMUSCULAR; INTRAVENOUS
Status: COMPLETED | OUTPATIENT
Start: 2024-07-11 | End: 2024-07-11

## 2024-07-11 RX ORDER — PROCHLORPERAZINE EDISYLATE 5 MG/ML
5 INJECTION INTRAMUSCULAR; INTRAVENOUS
Status: DISCONTINUED | OUTPATIENT
Start: 2024-07-11 | End: 2024-07-11 | Stop reason: HOSPADM

## 2024-07-11 RX ORDER — EPHEDRINE SULFATE 5 MG/ML
INJECTION INTRAVENOUS PRN
Status: DISCONTINUED | OUTPATIENT
Start: 2024-07-11 | End: 2024-07-11 | Stop reason: SDUPTHER

## 2024-07-11 RX ORDER — KETOROLAC TROMETHAMINE 30 MG/ML
INJECTION, SOLUTION INTRAMUSCULAR; INTRAVENOUS PRN
Status: DISCONTINUED | OUTPATIENT
Start: 2024-07-11 | End: 2024-07-11 | Stop reason: SDUPTHER

## 2024-07-11 RX ORDER — OXYCODONE HYDROCHLORIDE 5 MG/1
10 TABLET ORAL EVERY 4 HOURS PRN
Status: DISCONTINUED | OUTPATIENT
Start: 2024-07-11 | End: 2024-07-16 | Stop reason: HOSPADM

## 2024-07-11 RX ORDER — BUPIVACAINE HYDROCHLORIDE 5 MG/ML
INJECTION, SOLUTION EPIDURAL; INTRACAUDAL
Status: DISCONTINUED | OUTPATIENT
Start: 2024-07-11 | End: 2024-07-11 | Stop reason: SDUPTHER

## 2024-07-11 RX ORDER — DEXAMETHASONE SODIUM PHOSPHATE 10 MG/ML
INJECTION INTRAMUSCULAR; INTRAVENOUS PRN
Status: DISCONTINUED | OUTPATIENT
Start: 2024-07-11 | End: 2024-07-11 | Stop reason: SDUPTHER

## 2024-07-11 RX ORDER — HYDROMORPHONE HYDROCHLORIDE 2 MG/ML
0.5 INJECTION, SOLUTION INTRAMUSCULAR; INTRAVENOUS; SUBCUTANEOUS EVERY 5 MIN PRN
Status: DISCONTINUED | OUTPATIENT
Start: 2024-07-11 | End: 2024-07-11 | Stop reason: HOSPADM

## 2024-07-11 RX ORDER — IPRATROPIUM BROMIDE AND ALBUTEROL SULFATE 2.5; .5 MG/3ML; MG/3ML
1 SOLUTION RESPIRATORY (INHALATION)
Status: DISCONTINUED | OUTPATIENT
Start: 2024-07-11 | End: 2024-07-11 | Stop reason: HOSPADM

## 2024-07-11 RX ORDER — ACETAMINOPHEN 500 MG
1000 TABLET ORAL ONCE
Status: DISCONTINUED | OUTPATIENT
Start: 2024-07-11 | End: 2024-07-11 | Stop reason: HOSPADM

## 2024-07-11 RX ORDER — KETOROLAC TROMETHAMINE 30 MG/ML
30 INJECTION, SOLUTION INTRAMUSCULAR; INTRAVENOUS EVERY 6 HOURS PRN
Status: DISPENSED | OUTPATIENT
Start: 2024-07-11 | End: 2024-07-16

## 2024-07-11 RX ORDER — NEOSTIGMINE METHYLSULFATE 1 MG/ML
INJECTION, SOLUTION INTRAVENOUS PRN
Status: DISCONTINUED | OUTPATIENT
Start: 2024-07-11 | End: 2024-07-11 | Stop reason: SDUPTHER

## 2024-07-11 RX ORDER — LIDOCAINE HYDROCHLORIDE 10 MG/ML
1 INJECTION, SOLUTION INFILTRATION; PERINEURAL
Status: DISCONTINUED | OUTPATIENT
Start: 2024-07-11 | End: 2024-07-11 | Stop reason: HOSPADM

## 2024-07-11 RX ORDER — HYDROMORPHONE HYDROCHLORIDE 1 MG/ML
1 INJECTION, SOLUTION INTRAMUSCULAR; INTRAVENOUS; SUBCUTANEOUS
Status: DISCONTINUED | OUTPATIENT
Start: 2024-07-11 | End: 2024-07-16 | Stop reason: HOSPADM

## 2024-07-11 RX ORDER — SODIUM CHLORIDE 0.9 % (FLUSH) 0.9 %
5-40 SYRINGE (ML) INJECTION PRN
Status: DISCONTINUED | OUTPATIENT
Start: 2024-07-11 | End: 2024-07-11 | Stop reason: HOSPADM

## 2024-07-11 RX ORDER — ROCURONIUM BROMIDE 10 MG/ML
INJECTION, SOLUTION INTRAVENOUS PRN
Status: DISCONTINUED | OUTPATIENT
Start: 2024-07-11 | End: 2024-07-11 | Stop reason: SDUPTHER

## 2024-07-11 RX ORDER — SODIUM CHLORIDE, SODIUM LACTATE, POTASSIUM CHLORIDE, CALCIUM CHLORIDE 600; 310; 30; 20 MG/100ML; MG/100ML; MG/100ML; MG/100ML
INJECTION, SOLUTION INTRAVENOUS ONCE
Status: COMPLETED | OUTPATIENT
Start: 2024-07-11 | End: 2024-07-11

## 2024-07-11 RX ORDER — ONDANSETRON 2 MG/ML
INJECTION INTRAMUSCULAR; INTRAVENOUS PRN
Status: DISCONTINUED | OUTPATIENT
Start: 2024-07-11 | End: 2024-07-11 | Stop reason: SDUPTHER

## 2024-07-11 RX ORDER — SUCCINYLCHOLINE/SOD CL,ISO/PF 200MG/10ML
SYRINGE (ML) INTRAVENOUS PRN
Status: DISCONTINUED | OUTPATIENT
Start: 2024-07-11 | End: 2024-07-11 | Stop reason: SDUPTHER

## 2024-07-11 RX ORDER — PROPOFOL 10 MG/ML
INJECTION, EMULSION INTRAVENOUS PRN
Status: DISCONTINUED | OUTPATIENT
Start: 2024-07-11 | End: 2024-07-11 | Stop reason: SDUPTHER

## 2024-07-11 RX ADMIN — ONDANSETRON 4 MG: 2 INJECTION INTRAMUSCULAR; INTRAVENOUS at 11:14

## 2024-07-11 RX ADMIN — FENTANYL CITRATE 100 MCG: 50 INJECTION, SOLUTION INTRAMUSCULAR; INTRAVENOUS at 11:05

## 2024-07-11 RX ADMIN — Medication 160 MG: at 11:06

## 2024-07-11 RX ADMIN — SODIUM CHLORIDE, SODIUM LACTATE, POTASSIUM CHLORIDE, AND CALCIUM CHLORIDE: 600; 310; 30; 20 INJECTION, SOLUTION INTRAVENOUS at 10:52

## 2024-07-11 RX ADMIN — ROCURONIUM BROMIDE 35 MG: 10 INJECTION, SOLUTION INTRAVENOUS at 11:19

## 2024-07-11 RX ADMIN — PHENYLEPHRINE HYDROCHLORIDE 100 MCG: 10 INJECTION INTRAVENOUS at 11:19

## 2024-07-11 RX ADMIN — FENTANYL CITRATE 50 MCG: 50 INJECTION, SOLUTION INTRAMUSCULAR; INTRAVENOUS at 11:48

## 2024-07-11 RX ADMIN — PHENYLEPHRINE HYDROCHLORIDE 50 MCG: 10 INJECTION INTRAVENOUS at 11:17

## 2024-07-11 RX ADMIN — DEXAMETHASONE SODIUM PHOSPHATE 4 MG: 10 INJECTION, SOLUTION INTRAMUSCULAR; INTRAVENOUS at 12:26

## 2024-07-11 RX ADMIN — ONDANSETRON 4 MG: 2 INJECTION INTRAMUSCULAR; INTRAVENOUS at 14:00

## 2024-07-11 RX ADMIN — POTASSIUM CHLORIDE AND SODIUM CHLORIDE: 900; 150 INJECTION, SOLUTION INTRAVENOUS at 14:04

## 2024-07-11 RX ADMIN — MIDAZOLAM HYDROCHLORIDE 2 MG: 1 INJECTION, SOLUTION INTRAMUSCULAR; INTRAVENOUS at 10:52

## 2024-07-11 RX ADMIN — KETOROLAC TROMETHAMINE 30 MG: 30 INJECTION, SOLUTION INTRAMUSCULAR; INTRAVENOUS at 18:27

## 2024-07-11 RX ADMIN — SODIUM CHLORIDE, POTASSIUM CHLORIDE, SODIUM LACTATE AND CALCIUM CHLORIDE: 600; 310; 30; 20 INJECTION, SOLUTION INTRAVENOUS at 10:00

## 2024-07-11 RX ADMIN — EPHEDRINE SULFATE 5 MG: 5 INJECTION INTRAVENOUS at 11:17

## 2024-07-11 RX ADMIN — EPHEDRINE SULFATE 5 MG: 5 INJECTION INTRAVENOUS at 11:19

## 2024-07-11 RX ADMIN — KETOROLAC TROMETHAMINE 30 MG: 30 INJECTION, SOLUTION INTRAMUSCULAR at 12:07

## 2024-07-11 RX ADMIN — LIDOCAINE HYDROCHLORIDE 60 MG: 20 INJECTION, SOLUTION EPIDURAL; INFILTRATION; INTRACAUDAL; PERINEURAL at 11:05

## 2024-07-11 RX ADMIN — SODIUM CHLORIDE: 9 INJECTION, SOLUTION INTRAVENOUS at 14:06

## 2024-07-11 RX ADMIN — BUPIVACAINE HYDROCHLORIDE 15 ML: 5 INJECTION, SOLUTION EPIDURAL; INTRACAUDAL at 12:26

## 2024-07-11 RX ADMIN — DEXAMETHASONE SODIUM PHOSPHATE 10 MG: 10 INJECTION INTRAMUSCULAR; INTRAVENOUS at 11:14

## 2024-07-11 RX ADMIN — FENTANYL CITRATE 50 MCG: 50 INJECTION, SOLUTION INTRAMUSCULAR; INTRAVENOUS at 11:56

## 2024-07-11 RX ADMIN — BUPIVACAINE HYDROCHLORIDE 15 ML: 5 INJECTION, SOLUTION EPIDURAL; INTRACAUDAL; PERINEURAL at 12:23

## 2024-07-11 RX ADMIN — PROPOFOL 30 MG: 10 INJECTION, EMULSION INTRAVENOUS at 11:48

## 2024-07-11 RX ADMIN — ROCURONIUM BROMIDE 5 MG: 10 INJECTION, SOLUTION INTRAVENOUS at 11:05

## 2024-07-11 RX ADMIN — METRONIDAZOLE 500 MG: 500 INJECTION, SOLUTION INTRAVENOUS at 22:54

## 2024-07-11 RX ADMIN — GLYCOPYRROLATE 0.6 MG: 0.2 INJECTION INTRAMUSCULAR; INTRAVENOUS at 12:29

## 2024-07-11 RX ADMIN — PROPOFOL 150 MG: 10 INJECTION, EMULSION INTRAVENOUS at 11:05

## 2024-07-11 RX ADMIN — KETOROLAC TROMETHAMINE 30 MG: 30 INJECTION, SOLUTION INTRAMUSCULAR; INTRAVENOUS at 23:54

## 2024-07-11 RX ADMIN — CEFEPIME 2000 MG: 2 INJECTION, POWDER, FOR SOLUTION INTRAVENOUS at 08:09

## 2024-07-11 RX ADMIN — METRONIDAZOLE 500 MG: 500 INJECTION, SOLUTION INTRAVENOUS at 14:08

## 2024-07-11 RX ADMIN — PHENYLEPHRINE HYDROCHLORIDE 200 MCG: 10 INJECTION INTRAVENOUS at 11:11

## 2024-07-11 RX ADMIN — SODIUM CHLORIDE, PRESERVATIVE FREE 10 ML: 5 INJECTION INTRAVENOUS at 08:10

## 2024-07-11 RX ADMIN — DEXAMETHASONE SODIUM PHOSPHATE 4 MG: 10 INJECTION, SOLUTION INTRAMUSCULAR; INTRAVENOUS at 12:23

## 2024-07-11 RX ADMIN — CEFEPIME 2000 MG: 2 INJECTION, POWDER, FOR SOLUTION INTRAVENOUS at 20:10

## 2024-07-11 RX ADMIN — METRONIDAZOLE 500 MG: 500 INJECTION, SOLUTION INTRAVENOUS at 06:12

## 2024-07-11 RX ADMIN — Medication 4 MG: at 12:29

## 2024-07-11 ASSESSMENT — PAIN DESCRIPTION - ORIENTATION: ORIENTATION: MID

## 2024-07-11 ASSESSMENT — PAIN DESCRIPTION - DESCRIPTORS: DESCRIPTORS: ACHING

## 2024-07-11 ASSESSMENT — PAIN SCALES - GENERAL
PAINLEVEL_OUTOF10: 6
PAINLEVEL_OUTOF10: 7
PAINLEVEL_OUTOF10: 0

## 2024-07-11 ASSESSMENT — PAIN DESCRIPTION - LOCATION
LOCATION: ABDOMEN
LOCATION: ABDOMEN

## 2024-07-11 ASSESSMENT — PAIN - FUNCTIONAL ASSESSMENT
PAIN_FUNCTIONAL_ASSESSMENT: 0-10
PAIN_FUNCTIONAL_ASSESSMENT: NONE - DENIES PAIN

## 2024-07-11 NOTE — BRIEF OP NOTE
Brief Postoperative Note      Patient: Rama Barboza  YOB: 1965  MRN: [id number]    Date of Procedure: 7/11/2024    Pre-op Diagnosis: Intraabdominal abscess 2 weeks after a laparoscopic appendectomy done at [facility].    Post-Op Diagnosis: Same       Procedure: Diagnostic laparoscopy converted to an open laparotomy with drainage of an intraabdominal abscess.    Surgeon(s):  Gustavo Beckford MD    Assistant:  * No surgical staff found *    Anesthesia: General plus blocks    Estimated Blood Loss (mL): less than 50     Complications: None    Specimens:   ID Type Source Tests Collected by Time Destination   1 :   Abdomen  Gustavo Beckford MD 7/11/2024 1212        Implants:  * No implants in log *      Drains: * No LDAs found *    Findings:  Infection Present At Time Of Surgery (PATOS) (choose all levels that have infection present):  - Organ Space infection (below fascia) present as evidenced by abscess  Other Findings: See dictated note    Electronically signed by GUSTAVO BECKFORD MD on 7/11/2024 at 12:22 PM

## 2024-07-11 NOTE — PERIOP NOTE
TRANSFER - OUT REPORT:    Verbal report given to Yvette COSTELLO on Rama Barboza  being transferred to [facility] for routine post-op       Report consisted of patient’s Situation, Background, Assessment and   Recommendations(SBAR).     Information from the following report(s) Nurse Handoff Report, Adult Overview, Surgery Report, Intake/Output, and MAR was reviewed with the receiving nurse.    Lines:   Peripheral IV 07/09/24 Right Antecubital (Active)   Site Assessment Clean, dry & intact 07/11/24 1244   Line Status Infusing 07/11/24 1244   Line Care Connections checked and tightened 07/11/24 1244   Phlebitis Assessment No symptoms 07/11/24 1244   Infiltration Assessment 0 07/11/24 1244   Alcohol Cap Used No 07/11/24 0954   Dressing Status Clean, dry & intact 07/11/24 1244   Dressing Type Transparent 07/11/24 1244       Peripheral IV 07/09/24 Left Antecubital (Active)   Site Assessment Clean, dry & intact 07/11/24 1244   Line Status Infusing 07/11/24 1244   Line Care Connections checked and tightened 07/11/24 1244   Phlebitis Assessment No symptoms 07/11/24 1244   Infiltration Assessment 0 07/11/24 1244   Alcohol Cap Used No 07/11/24 0953   Dressing Status Clean, dry & intact 07/11/24 1244   Dressing Type Transparent 07/11/24 1244        Opportunity for questions and clarification was provided.      Patient transported with:   O2 @ 2 liters    VTE prophylaxis orders have been written for Rama Barboza.    Patient and family given floor number and nurses name.  Family updated re: pt status after security code verified.

## 2024-07-11 NOTE — ANESTHESIA POSTPROCEDURE EVALUATION
Department of Anesthesiology  Postprocedure Note    Patient: Rama Barboza  MRN: [id number]  YOB: 1965  Date of evaluation: 7/11/2024    Procedure Summary       Date: 07/11/24 Room / Location: [facility] MAIN OR 09 / [facility] MAIN OR    Anesthesia Start: 1052 Anesthesia Stop: 1246    Procedure: LAPAROSCOPY DIAGNOSTIC WITH INTRAABDOMINAL ABSCESS DRAINAGE CONVERTED TO OPEN (Abdomen) Diagnosis:       Septicemia (HCC)      (Septicemia (HCC) [A41.9])    Providers: Gustavo Hathaway MD Responsible Provider: Ruben Taylor MD    Anesthesia Type: General ASA Status: 1            Anesthesia Type: General    Kapil Phase I: Kapil Score: 10    Kapil Phase II:      Anesthesia Post Evaluation    Patient location during evaluation: PACU  Patient participation: complete - patient participated  Level of consciousness: awake  Airway patency: patent  Nausea & Vomiting: no nausea  Cardiovascular status: hemodynamically stable  Respiratory status: acceptable and nonlabored ventilation  Hydration status: stable  Multimodal analgesia pain management approach  Pain management: adequate    No notable events documented.

## 2024-07-11 NOTE — PERIOP NOTE
Patient has wedding ring she says can not be removed. This nurse taped around them with silk tape to prevent them from getting lost.

## 2024-07-11 NOTE — ANESTHESIA PROCEDURE NOTES
Peripheral Block    Patient location during procedure: OR  Reason for block: post-op pain management and at surgeon's request  Start time: 7/11/2024 12:26 PM  End time: 7/11/2024 12:28 PM  Staffing  Performed: anesthesiologist   Anesthesiologist: Ruben Taylor MD  Performed by: Ruben Taylor MD  Authorized by: Ruben Taylor MD    Preanesthetic Checklist  Completed: patient identified, IV checked, site marked, risks and benefits discussed, surgical/procedural consents, equipment checked, pre-op evaluation, timeout performed, anesthesia consent given, oxygen available and monitors applied/VS acknowledged  Peripheral Block   Patient position: supine  Prep: ChloraPrep  Provider prep: mask  Patient monitoring: cardiac monitor, continuous pulse ox, continuous capnometry, IV access, frequent blood pressure checks and oxygen  Block type: Rectus sheath  Laterality: left  Injection technique: single-shot  Guidance: ultrasound guided    Needle   Needle type: insulated echogenic nerve stimulator needle   Needle gauge: 21 G  Needle localization: ultrasound guidance  Needle length: 10 cm  Assessment   Injection assessment: negative aspiration for heme, no paresthesia on injection and no intravascular symptoms  Paresthesia pain: none  Slow fractionated injection: yes  Hemodynamics: stable  Outcomes: uncomplicated    Additional Notes  Peritoneum not violated  -Block placed for post op pain at surgeon's request.     -Ultrasound used to identify anatomy of nerve bundle.    -Needle placement and local injection at perineural area confirmed with real time ultrasound guidance.     -Local visualized with ultrasound surrounding nerve.    -Permanent Image taken and placed on chart.      With 5 ml NS for final volume 20 ml of 0.375%  Medications Administered  BUPivacaine (MARCAINE) PF injection 0.5% - Perineural   15 mL - 7/11/2024 12:26:00 PM  dexAMETHasone (DECADRON) (PF) 10 mg/mL injection - Other   4 mg - 7/11/2024 12:26:00 
PM

## 2024-07-11 NOTE — PLAN OF CARE
Problem: Discharge Planning  Goal: Discharge to home or other facility with appropriate resources  7/11/2024 0842 by Yvette Conroy RN  Outcome: Progressing  Flowsheets (Taken 7/11/2024 0809)  Discharge to home or other facility with appropriate resources:   Identify barriers to discharge with patient and caregiver   Identify discharge learning needs (meds, wound care, etc)   Refer to discharge planning if patient needs post-hospital services based on physician order or complex needs related to functional status, cognitive ability or social support system  7/10/2024 2128 by Clarice Gary RN  Outcome: Progressing     Problem: Pain  Goal: Verbalizes/displays adequate comfort level or baseline comfort level  7/11/2024 0842 by Yvette Conroy RN  Outcome: Progressing  Flowsheets (Taken 7/11/2024 0809)  Verbalizes/displays adequate comfort level or baseline comfort level:   Encourage patient to monitor pain and request assistance   Assess pain using appropriate pain scale   Implement non-pharmacological measures as appropriate and evaluate response  7/10/2024 2128 by Clarice Gary, RN  Outcome: Progressing     Problem: Safety - Adult  Goal: Free from fall injury  7/11/2024 0842 by Yvette Conroy, RN  Outcome: Progressing  7/10/2024 2128 by Clarice Gary, RN  Outcome: Progressing     Problem: Nutrition Deficit:  Goal: Optimize nutritional status  7/11/2024 0842 by Yvette Conroy, RN  Outcome: Progressing  7/10/2024 2128 by Clarice Gary, RN  Outcome: Progressing

## 2024-07-12 PROCEDURE — 2580000003 HC RX 258: Performed by: HOSPITALIST

## 2024-07-12 PROCEDURE — 6360000002 HC RX W HCPCS: Performed by: HOSPITALIST

## 2024-07-12 PROCEDURE — 6360000002 HC RX W HCPCS: Performed by: SURGERY

## 2024-07-12 PROCEDURE — 1100000000 HC RM PRIVATE

## 2024-07-12 RX ORDER — METOCLOPRAMIDE HYDROCHLORIDE 5 MG/ML
10 INJECTION INTRAMUSCULAR; INTRAVENOUS EVERY 6 HOURS
Status: DISCONTINUED | OUTPATIENT
Start: 2024-07-12 | End: 2024-07-14

## 2024-07-12 RX ADMIN — POTASSIUM CHLORIDE AND SODIUM CHLORIDE: 900; 150 INJECTION, SOLUTION INTRAVENOUS at 17:05

## 2024-07-12 RX ADMIN — SODIUM CHLORIDE, PRESERVATIVE FREE 10 ML: 5 INJECTION INTRAVENOUS at 08:32

## 2024-07-12 RX ADMIN — KETOROLAC TROMETHAMINE 30 MG: 30 INJECTION, SOLUTION INTRAMUSCULAR; INTRAVENOUS at 06:07

## 2024-07-12 RX ADMIN — METRONIDAZOLE 500 MG: 500 INJECTION, SOLUTION INTRAVENOUS at 14:54

## 2024-07-12 RX ADMIN — METOCLOPRAMIDE 10 MG: 5 INJECTION, SOLUTION INTRAMUSCULAR; INTRAVENOUS at 23:32

## 2024-07-12 RX ADMIN — METOCLOPRAMIDE 10 MG: 5 INJECTION, SOLUTION INTRAMUSCULAR; INTRAVENOUS at 11:21

## 2024-07-12 RX ADMIN — METOCLOPRAMIDE 10 MG: 5 INJECTION, SOLUTION INTRAMUSCULAR; INTRAVENOUS at 17:02

## 2024-07-12 RX ADMIN — METRONIDAZOLE 500 MG: 500 INJECTION, SOLUTION INTRAVENOUS at 06:07

## 2024-07-12 RX ADMIN — KETOROLAC TROMETHAMINE 30 MG: 30 INJECTION, SOLUTION INTRAMUSCULAR; INTRAVENOUS at 17:23

## 2024-07-12 RX ADMIN — SODIUM CHLORIDE, PRESERVATIVE FREE 10 ML: 5 INJECTION INTRAVENOUS at 21:27

## 2024-07-12 RX ADMIN — CEFEPIME 2000 MG: 2 INJECTION, POWDER, FOR SOLUTION INTRAVENOUS at 21:26

## 2024-07-12 RX ADMIN — CEFEPIME 2000 MG: 2 INJECTION, POWDER, FOR SOLUTION INTRAVENOUS at 08:32

## 2024-07-12 ASSESSMENT — PAIN SCALES - GENERAL
PAINLEVEL_OUTOF10: 0
PAINLEVEL_OUTOF10: 0
PAINLEVEL_OUTOF10: 6
PAINLEVEL_OUTOF10: 4

## 2024-07-12 ASSESSMENT — PAIN DESCRIPTION - ORIENTATION
ORIENTATION: MID
ORIENTATION: MID

## 2024-07-12 ASSESSMENT — PAIN DESCRIPTION - DESCRIPTORS
DESCRIPTORS: ACHING
DESCRIPTORS: ACHING

## 2024-07-12 ASSESSMENT — PAIN DESCRIPTION - LOCATION
LOCATION: ABDOMEN
LOCATION: ABDOMEN

## 2024-07-12 NOTE — PLAN OF CARE
Problem: Discharge Planning  Goal: Discharge to home or other facility with appropriate resources  7/11/2024 2019 by Clarice Gary, RN  Outcome: Progressing  7/11/2024 0842 by Yvette Conroy RN  Outcome: Progressing  Flowsheets (Taken 7/11/2024 0809)  Discharge to home or other facility with appropriate resources:   Identify barriers to discharge with patient and caregiver   Identify discharge learning needs (meds, wound care, etc)   Refer to discharge planning if patient needs post-hospital services based on physician order or complex needs related to functional status, cognitive ability or social support system     Problem: Pain  Goal: Verbalizes/displays adequate comfort level or baseline comfort level  7/11/2024 2019 by Clarice Gary, RN  Outcome: Progressing  7/11/2024 0842 by Yvette Conroy RN  Outcome: Progressing  Flowsheets (Taken 7/11/2024 0809)  Verbalizes/displays adequate comfort level or baseline comfort level:   Encourage patient to monitor pain and request assistance   Assess pain using appropriate pain scale   Implement non-pharmacological measures as appropriate and evaluate response     Problem: Safety - Adult  Goal: Free from fall injury  7/11/2024 2019 by Clarice Gary, RN  Outcome: Progressing  7/11/2024 0842 by Yvette Conroy RN  Outcome: Progressing     Problem: Nutrition Deficit:  Goal: Optimize nutritional status  7/11/2024 2019 by Clarice Gary, RN  Outcome: Progressing  7/11/2024 0842 by Yvette Conroy, RN  Outcome: Progressing

## 2024-07-12 NOTE — OP NOTE
I spent about 15 minutes trying to find where this abscess cavity was and I could not find it, it was hard to identify the teniae to see where the appendiceal stump had been.  At this point, I did not feel like I was getting anywhere and I elected to make an incision between her umbilicus and the pubic bone.  We got into the abdominal cavity, put my right hand in and was able to feel a very thick walled cecum and abscess that was posterior and along the right paracolic gutter.  I put my hand into this and peeled this away and got into the abscess cavity, I took a culture swab of this and sent it to microbiology for culture and sensitivity.  There was a large cavity and a large amount of purulent material was found.  There was some diffuse oozing from the abscess cavity.  I could not identify an appendiceal stump.  I irrigated this area with 2 L of warm saline.  I did not see any active bleeding at this point.  I used the 5 mm trocar site in the left lower quadrant and brought a tonsil clamp through this to use a 19 round Jorge drain.  I placed this into the abscess cavity and brought it out through the old trocar site and sutured it to the skin with a 2-0 nylon.  The drain was later attached to bulb suction.  Lap and instrument counts were done.  I closed the fascia with one #1 looped PDS suture.  Subcutaneous tissue was irrigated.  Skin closed with surgical staples.  The laparoscopic incision sites were all closed with subcuticular sutures of 4-0 Monocryl.  Dr. Taylor then came in the room and once everything was completed, Dr. Taylor did blocks of the abdominal wall.    DRAINS:  A #19 round Jorge drain was placed into the old abscess cavity through an incision in the left lower quadrant.        MD AMY ROMERO/AQS  D:  07/11/2024 19:40:01  T:  07/11/2024 22:51:56  JOB #:  [id number]

## 2024-07-13 LAB
ANION GAP SERPL CALC-SCNC: 8 MMOL/L (ref 9–18)
BACTERIA SPEC CULT: ABNORMAL
BASOPHILS # BLD: 0.1 K/UL (ref 0–0.2)
BASOPHILS NFR BLD: 1 % (ref 0–2)
BUN SERPL-MCNC: 10 MG/DL (ref 6–23)
CALCIUM SERPL-MCNC: 8.1 MG/DL (ref 8.8–10.2)
CHLORIDE SERPL-SCNC: 108 MMOL/L (ref 98–107)
CO2 SERPL-SCNC: 21 MMOL/L (ref 20–28)
CREAT SERPL-MCNC: 0.53 MG/DL (ref 0.6–1.1)
DIFFERENTIAL METHOD BLD: ABNORMAL
EOSINOPHIL # BLD: 0.2 K/UL (ref 0–0.8)
EOSINOPHIL NFR BLD: 2 % (ref 0.5–7.8)
ERYTHROCYTE [DISTWIDTH] IN BLOOD BY AUTOMATED COUNT: 12 % (ref 11.9–14.6)
GLUCOSE SERPL-MCNC: 119 MG/DL (ref 70–99)
GRAM STN SPEC: ABNORMAL
GRAM STN SPEC: ABNORMAL
HCT VFR BLD AUTO: 29.6 % (ref 35.8–46.3)
HGB BLD-MCNC: 9.7 G/DL (ref 11.7–15.4)
IMM GRANULOCYTES # BLD AUTO: 0.1 K/UL (ref 0–0.5)
IMM GRANULOCYTES NFR BLD AUTO: 1 % (ref 0–5)
LYMPHOCYTES # BLD: 0.9 K/UL (ref 0.5–4.6)
LYMPHOCYTES NFR BLD: 10 % (ref 13–44)
MCH RBC QN AUTO: 29 PG (ref 26.1–32.9)
MCHC RBC AUTO-ENTMCNC: 32.8 G/DL (ref 31.4–35)
MCV RBC AUTO: 88.4 FL (ref 82–102)
MONOCYTES # BLD: 0.4 K/UL (ref 0.1–1.3)
MONOCYTES NFR BLD: 4 % (ref 4–12)
NEUTS SEG # BLD: 7.7 K/UL (ref 1.7–8.2)
NEUTS SEG NFR BLD: 82 % (ref 43–78)
NRBC # BLD: 0 K/UL (ref 0–0.2)
PLATELET # BLD AUTO: 341 K/UL (ref 150–450)
PMV BLD AUTO: 9 FL (ref 9.4–12.3)
POTASSIUM SERPL-SCNC: 4 MMOL/L (ref 3.5–5.1)
RBC # BLD AUTO: 3.35 M/UL (ref 4.05–5.2)
SERVICE CMNT-IMP: ABNORMAL
SODIUM SERPL-SCNC: 137 MMOL/L (ref 136–145)
WBC # BLD AUTO: 9.3 K/UL (ref 4.3–11.1)

## 2024-07-13 PROCEDURE — 2580000003 HC RX 258: Performed by: HOSPITALIST

## 2024-07-13 PROCEDURE — 1100000000 HC RM PRIVATE

## 2024-07-13 PROCEDURE — 85025 COMPLETE CBC W/AUTO DIFF WBC: CPT

## 2024-07-13 PROCEDURE — 6360000002 HC RX W HCPCS: Performed by: HOSPITALIST

## 2024-07-13 PROCEDURE — 80048 BASIC METABOLIC PNL TOTAL CA: CPT

## 2024-07-13 PROCEDURE — 36415 COLL VENOUS BLD VENIPUNCTURE: CPT

## 2024-07-13 PROCEDURE — 6360000002 HC RX W HCPCS: Performed by: SURGERY

## 2024-07-13 RX ADMIN — METOCLOPRAMIDE 10 MG: 5 INJECTION, SOLUTION INTRAMUSCULAR; INTRAVENOUS at 23:26

## 2024-07-13 RX ADMIN — SODIUM CHLORIDE, PRESERVATIVE FREE 10 ML: 5 INJECTION INTRAVENOUS at 20:31

## 2024-07-13 RX ADMIN — METRONIDAZOLE 500 MG: 500 INJECTION, SOLUTION INTRAVENOUS at 23:26

## 2024-07-13 RX ADMIN — CEFEPIME 2000 MG: 2 INJECTION, POWDER, FOR SOLUTION INTRAVENOUS at 08:24

## 2024-07-13 RX ADMIN — KETOROLAC TROMETHAMINE 30 MG: 30 INJECTION, SOLUTION INTRAMUSCULAR; INTRAVENOUS at 08:17

## 2024-07-13 RX ADMIN — METOCLOPRAMIDE 10 MG: 5 INJECTION, SOLUTION INTRAMUSCULAR; INTRAVENOUS at 16:23

## 2024-07-13 RX ADMIN — POTASSIUM CHLORIDE AND SODIUM CHLORIDE: 900; 150 INJECTION, SOLUTION INTRAVENOUS at 13:52

## 2024-07-13 RX ADMIN — METRONIDAZOLE 500 MG: 500 INJECTION, SOLUTION INTRAVENOUS at 14:22

## 2024-07-13 RX ADMIN — METRONIDAZOLE 500 MG: 500 INJECTION, SOLUTION INTRAVENOUS at 07:22

## 2024-07-13 RX ADMIN — CEFEPIME 2000 MG: 2 INJECTION, POWDER, FOR SOLUTION INTRAVENOUS at 20:30

## 2024-07-13 RX ADMIN — METRONIDAZOLE 500 MG: 500 INJECTION, SOLUTION INTRAVENOUS at 00:10

## 2024-07-13 RX ADMIN — METOCLOPRAMIDE 10 MG: 5 INJECTION, SOLUTION INTRAMUSCULAR; INTRAVENOUS at 05:05

## 2024-07-13 RX ADMIN — SODIUM CHLORIDE, PRESERVATIVE FREE 10 ML: 5 INJECTION INTRAVENOUS at 08:18

## 2024-07-13 RX ADMIN — METOCLOPRAMIDE 10 MG: 5 INJECTION, SOLUTION INTRAMUSCULAR; INTRAVENOUS at 11:53

## 2024-07-13 ASSESSMENT — PAIN DESCRIPTION - ONSET: ONSET: ON-GOING

## 2024-07-13 ASSESSMENT — PAIN DESCRIPTION - LOCATION: LOCATION: ABDOMEN

## 2024-07-13 ASSESSMENT — PAIN DESCRIPTION - PAIN TYPE: TYPE: ACUTE PAIN

## 2024-07-13 ASSESSMENT — PAIN DESCRIPTION - ORIENTATION: ORIENTATION: MID

## 2024-07-13 ASSESSMENT — PAIN SCALES - GENERAL: PAINLEVEL_OUTOF10: 4

## 2024-07-13 ASSESSMENT — PAIN - FUNCTIONAL ASSESSMENT: PAIN_FUNCTIONAL_ASSESSMENT: ACTIVITIES ARE NOT PREVENTED

## 2024-07-13 ASSESSMENT — PAIN DESCRIPTION - FREQUENCY: FREQUENCY: CONTINUOUS

## 2024-07-13 ASSESSMENT — PAIN DESCRIPTION - DESCRIPTORS: DESCRIPTORS: SHARP

## 2024-07-14 LAB
ANION GAP SERPL CALC-SCNC: 12 MMOL/L (ref 9–18)
BACTERIA SPEC CULT: NORMAL
BACTERIA SPEC CULT: NORMAL
BASOPHILS # BLD: 0.1 K/UL (ref 0–0.2)
BASOPHILS NFR BLD: 1 % (ref 0–2)
BUN SERPL-MCNC: 5 MG/DL (ref 6–23)
CALCIUM SERPL-MCNC: 8.5 MG/DL (ref 8.8–10.2)
CHLORIDE SERPL-SCNC: 104 MMOL/L (ref 98–107)
CO2 SERPL-SCNC: 20 MMOL/L (ref 20–28)
CREAT SERPL-MCNC: 0.43 MG/DL (ref 0.6–1.1)
DIFFERENTIAL METHOD BLD: ABNORMAL
EOSINOPHIL # BLD: 0.3 K/UL (ref 0–0.8)
EOSINOPHIL NFR BLD: 5 % (ref 0.5–7.8)
ERYTHROCYTE [DISTWIDTH] IN BLOOD BY AUTOMATED COUNT: 11.9 % (ref 11.9–14.6)
FUNGAL CULT/SMEAR: NORMAL
GLUCOSE SERPL-MCNC: 94 MG/DL (ref 70–99)
HCT VFR BLD AUTO: 32 % (ref 35.8–46.3)
HGB BLD-MCNC: 10.3 G/DL (ref 11.7–15.4)
IMM GRANULOCYTES # BLD AUTO: 0 K/UL (ref 0–0.5)
IMM GRANULOCYTES NFR BLD AUTO: 0 % (ref 0–5)
LYMPHOCYTES # BLD: 0.9 K/UL (ref 0.5–4.6)
LYMPHOCYTES NFR BLD: 13 % (ref 13–44)
MCH RBC QN AUTO: 29 PG (ref 26.1–32.9)
MCHC RBC AUTO-ENTMCNC: 32.2 G/DL (ref 31.4–35)
MCV RBC AUTO: 90.1 FL (ref 82–102)
MONOCYTES # BLD: 0.4 K/UL (ref 0.1–1.3)
MONOCYTES NFR BLD: 6 % (ref 4–12)
NEUTS SEG # BLD: 5.2 K/UL (ref 1.7–8.2)
NEUTS SEG NFR BLD: 76 % (ref 43–78)
NRBC # BLD: 0 K/UL (ref 0–0.2)
PLATELET # BLD AUTO: 351 K/UL (ref 150–450)
PMV BLD AUTO: 9.5 FL (ref 9.4–12.3)
POTASSIUM SERPL-SCNC: 3.8 MMOL/L (ref 3.5–5.1)
RBC # BLD AUTO: 3.55 M/UL (ref 4.05–5.2)
SERVICE CMNT-IMP: NORMAL
SERVICE CMNT-IMP: NORMAL
SODIUM SERPL-SCNC: 137 MMOL/L (ref 136–145)
SPECIMEN PROCESSING: NORMAL
SPECIMEN SOURCE: NORMAL
WBC # BLD AUTO: 6.9 K/UL (ref 4.3–11.1)

## 2024-07-14 PROCEDURE — 85025 COMPLETE CBC W/AUTO DIFF WBC: CPT

## 2024-07-14 PROCEDURE — 80048 BASIC METABOLIC PNL TOTAL CA: CPT

## 2024-07-14 PROCEDURE — 1100000000 HC RM PRIVATE

## 2024-07-14 PROCEDURE — 6360000002 HC RX W HCPCS: Performed by: HOSPITALIST

## 2024-07-14 PROCEDURE — 6370000000 HC RX 637 (ALT 250 FOR IP): Performed by: NURSE PRACTITIONER

## 2024-07-14 PROCEDURE — 36415 COLL VENOUS BLD VENIPUNCTURE: CPT

## 2024-07-14 PROCEDURE — 6360000002 HC RX W HCPCS: Performed by: SURGERY

## 2024-07-14 PROCEDURE — 2580000003 HC RX 258: Performed by: HOSPITALIST

## 2024-07-14 RX ORDER — FAMOTIDINE 20 MG/1
20 TABLET, FILM COATED ORAL 2 TIMES DAILY
Status: DISCONTINUED | OUTPATIENT
Start: 2024-07-14 | End: 2024-07-16 | Stop reason: HOSPADM

## 2024-07-14 RX ADMIN — POTASSIUM CHLORIDE AND SODIUM CHLORIDE: 900; 150 INJECTION, SOLUTION INTRAVENOUS at 10:27

## 2024-07-14 RX ADMIN — METOCLOPRAMIDE 10 MG: 5 INJECTION, SOLUTION INTRAMUSCULAR; INTRAVENOUS at 05:00

## 2024-07-14 RX ADMIN — KETOROLAC TROMETHAMINE 30 MG: 30 INJECTION, SOLUTION INTRAMUSCULAR; INTRAVENOUS at 14:22

## 2024-07-14 RX ADMIN — CEFEPIME 2000 MG: 2 INJECTION, POWDER, FOR SOLUTION INTRAVENOUS at 20:14

## 2024-07-14 RX ADMIN — METRONIDAZOLE 500 MG: 500 INJECTION, SOLUTION INTRAVENOUS at 14:35

## 2024-07-14 RX ADMIN — SODIUM CHLORIDE, PRESERVATIVE FREE 10 ML: 5 INJECTION INTRAVENOUS at 08:38

## 2024-07-14 RX ADMIN — METRONIDAZOLE 500 MG: 500 INJECTION, SOLUTION INTRAVENOUS at 22:38

## 2024-07-14 RX ADMIN — SODIUM CHLORIDE, PRESERVATIVE FREE 10 ML: 5 INJECTION INTRAVENOUS at 20:20

## 2024-07-14 RX ADMIN — CEFEPIME 2000 MG: 2 INJECTION, POWDER, FOR SOLUTION INTRAVENOUS at 08:39

## 2024-07-14 RX ADMIN — FAMOTIDINE 20 MG: 20 TABLET, FILM COATED ORAL at 14:33

## 2024-07-14 RX ADMIN — METRONIDAZOLE 500 MG: 500 INJECTION, SOLUTION INTRAVENOUS at 06:30

## 2024-07-14 RX ADMIN — FAMOTIDINE 20 MG: 20 TABLET, FILM COATED ORAL at 20:19

## 2024-07-14 ASSESSMENT — PAIN - FUNCTIONAL ASSESSMENT: PAIN_FUNCTIONAL_ASSESSMENT: ACTIVITIES ARE NOT PREVENTED

## 2024-07-14 ASSESSMENT — PAIN SCALES - GENERAL
PAINLEVEL_OUTOF10: 0
PAINLEVEL_OUTOF10: 2
PAINLEVEL_OUTOF10: 5

## 2024-07-14 ASSESSMENT — PAIN DESCRIPTION - PAIN TYPE: TYPE: SURGICAL PAIN

## 2024-07-14 ASSESSMENT — PAIN DESCRIPTION - ORIENTATION: ORIENTATION: MID;LOWER

## 2024-07-14 ASSESSMENT — PAIN DESCRIPTION - DESCRIPTORS: DESCRIPTORS: CRAMPING;SHARP

## 2024-07-14 ASSESSMENT — PAIN DESCRIPTION - LOCATION: LOCATION: ABDOMEN

## 2024-07-14 ASSESSMENT — PAIN DESCRIPTION - FREQUENCY: FREQUENCY: INTERMITTENT

## 2024-07-15 PROCEDURE — 6370000000 HC RX 637 (ALT 250 FOR IP): Performed by: NURSE PRACTITIONER

## 2024-07-15 PROCEDURE — 1100000000 HC RM PRIVATE

## 2024-07-15 PROCEDURE — 6360000002 HC RX W HCPCS: Performed by: HOSPITALIST

## 2024-07-15 PROCEDURE — 6370000000 HC RX 637 (ALT 250 FOR IP)

## 2024-07-15 PROCEDURE — 2580000003 HC RX 258: Performed by: HOSPITALIST

## 2024-07-15 PROCEDURE — 6360000002 HC RX W HCPCS: Performed by: SURGERY

## 2024-07-15 RX ORDER — SIMETHICONE 80 MG
80 TABLET,CHEWABLE ORAL EVERY 6 HOURS PRN
Status: DISCONTINUED | OUTPATIENT
Start: 2024-07-15 | End: 2024-07-16 | Stop reason: HOSPADM

## 2024-07-15 RX ADMIN — METRONIDAZOLE 500 MG: 500 INJECTION, SOLUTION INTRAVENOUS at 23:11

## 2024-07-15 RX ADMIN — POTASSIUM CHLORIDE AND SODIUM CHLORIDE: 900; 150 INJECTION, SOLUTION INTRAVENOUS at 06:16

## 2024-07-15 RX ADMIN — CEFEPIME 2000 MG: 2 INJECTION, POWDER, FOR SOLUTION INTRAVENOUS at 21:09

## 2024-07-15 RX ADMIN — SIMETHICONE 80 MG: 80 TABLET, CHEWABLE ORAL at 18:34

## 2024-07-15 RX ADMIN — CEFEPIME 2000 MG: 2 INJECTION, POWDER, FOR SOLUTION INTRAVENOUS at 08:34

## 2024-07-15 RX ADMIN — SODIUM CHLORIDE: 9 INJECTION, SOLUTION INTRAVENOUS at 14:41

## 2024-07-15 RX ADMIN — SODIUM CHLORIDE, PRESERVATIVE FREE 10 ML: 5 INJECTION INTRAVENOUS at 21:06

## 2024-07-15 RX ADMIN — METRONIDAZOLE 500 MG: 500 INJECTION, SOLUTION INTRAVENOUS at 06:03

## 2024-07-15 RX ADMIN — FAMOTIDINE 20 MG: 20 TABLET, FILM COATED ORAL at 20:59

## 2024-07-15 RX ADMIN — METRONIDAZOLE 500 MG: 500 INJECTION, SOLUTION INTRAVENOUS at 14:41

## 2024-07-15 RX ADMIN — FAMOTIDINE 20 MG: 20 TABLET, FILM COATED ORAL at 08:33

## 2024-07-15 ASSESSMENT — PAIN SCALES - GENERAL: PAINLEVEL_OUTOF10: 2

## 2024-07-15 NOTE — CARE COORDINATION
CM reviewed chart.     Patient's discharge plan is to return home when medically stable for discharge.     No CM needs voiced or noted. CM will continue to follow patient for any discharge planning needs.

## 2024-07-16 VITALS
TEMPERATURE: 97.7 F | HEIGHT: 61 IN | BODY MASS INDEX: 27 KG/M2 | DIASTOLIC BLOOD PRESSURE: 86 MMHG | WEIGHT: 143 LBS | HEART RATE: 94 BPM | SYSTOLIC BLOOD PRESSURE: 109 MMHG | RESPIRATION RATE: 16 BRPM | OXYGEN SATURATION: 98 %

## 2024-07-16 LAB
ANION GAP SERPL CALC-SCNC: 12 MMOL/L (ref 9–18)
BASOPHILS # BLD: 0.1 K/UL (ref 0–0.2)
BASOPHILS NFR BLD: 1 % (ref 0–2)
BUN SERPL-MCNC: 5 MG/DL (ref 6–23)
CALCIUM SERPL-MCNC: 8.3 MG/DL (ref 8.8–10.2)
CHLORIDE SERPL-SCNC: 104 MMOL/L (ref 98–107)
CO2 SERPL-SCNC: 23 MMOL/L (ref 20–28)
CREAT SERPL-MCNC: 0.45 MG/DL (ref 0.6–1.1)
DIFFERENTIAL METHOD BLD: ABNORMAL
EOSINOPHIL # BLD: 0.5 K/UL (ref 0–0.8)
EOSINOPHIL NFR BLD: 7 % (ref 0.5–7.8)
ERYTHROCYTE [DISTWIDTH] IN BLOOD BY AUTOMATED COUNT: 11.7 % (ref 11.9–14.6)
FUNGUS SMEAR: NORMAL
GLUCOSE SERPL-MCNC: 100 MG/DL (ref 70–99)
HCT VFR BLD AUTO: 32 % (ref 35.8–46.3)
HGB BLD-MCNC: 11 G/DL (ref 11.7–15.4)
IMM GRANULOCYTES # BLD AUTO: 0 K/UL (ref 0–0.5)
IMM GRANULOCYTES NFR BLD AUTO: 1 % (ref 0–5)
LYMPHOCYTES # BLD: 1.1 K/UL (ref 0.5–4.6)
LYMPHOCYTES NFR BLD: 17 % (ref 13–44)
MCH RBC QN AUTO: 28.9 PG (ref 26.1–32.9)
MCHC RBC AUTO-ENTMCNC: 34.4 G/DL (ref 31.4–35)
MCV RBC AUTO: 84.2 FL (ref 82–102)
MONOCYTES # BLD: 0.5 K/UL (ref 0.1–1.3)
MONOCYTES NFR BLD: 7 % (ref 4–12)
NEUTS SEG # BLD: 4.5 K/UL (ref 1.7–8.2)
NEUTS SEG NFR BLD: 68 % (ref 43–78)
NRBC # BLD: 0 K/UL (ref 0–0.2)
PLATELET # BLD AUTO: 395 K/UL (ref 150–450)
PMV BLD AUTO: 8.6 FL (ref 9.4–12.3)
POTASSIUM SERPL-SCNC: 3.9 MMOL/L (ref 3.5–5.1)
RBC # BLD AUTO: 3.8 M/UL (ref 4.05–5.2)
SODIUM SERPL-SCNC: 138 MMOL/L (ref 136–145)
SPECIMEN SOURCE: NORMAL
WBC # BLD AUTO: 6.6 K/UL (ref 4.3–11.1)

## 2024-07-16 PROCEDURE — 85025 COMPLETE CBC W/AUTO DIFF WBC: CPT

## 2024-07-16 PROCEDURE — 6370000000 HC RX 637 (ALT 250 FOR IP): Performed by: NURSE PRACTITIONER

## 2024-07-16 PROCEDURE — 2580000003 HC RX 258: Performed by: HOSPITALIST

## 2024-07-16 PROCEDURE — 80048 BASIC METABOLIC PNL TOTAL CA: CPT

## 2024-07-16 PROCEDURE — 6360000002 HC RX W HCPCS: Performed by: HOSPITALIST

## 2024-07-16 PROCEDURE — 36415 COLL VENOUS BLD VENIPUNCTURE: CPT

## 2024-07-16 RX ORDER — CIPROFLOXACIN 500 MG/1
500 TABLET, FILM COATED ORAL 2 TIMES DAILY
Qty: 14 TABLET | Refills: 0 | Status: SHIPPED | OUTPATIENT
Start: 2024-07-16 | End: 2024-07-23

## 2024-07-16 RX ORDER — METRONIDAZOLE 500 MG/1
500 TABLET ORAL 3 TIMES DAILY
Qty: 21 TABLET | Refills: 0 | Status: SHIPPED | OUTPATIENT
Start: 2024-07-16 | End: 2024-07-23

## 2024-07-16 RX ADMIN — CEFEPIME 2000 MG: 2 INJECTION, POWDER, FOR SOLUTION INTRAVENOUS at 08:14

## 2024-07-16 RX ADMIN — FAMOTIDINE 20 MG: 20 TABLET, FILM COATED ORAL at 08:13

## 2024-07-16 RX ADMIN — METRONIDAZOLE 500 MG: 500 INJECTION, SOLUTION INTRAVENOUS at 06:33

## 2024-07-16 NOTE — DISCHARGE INSTRUCTIONS
Discharge Instructions/Follow-up Plans:   MD Instructions:     Follow-up with Dr. Hathaway in 2 weeks (the week of 7/29/24)    Incisions  Keep incisions clean and dry, may remain uncovered.  Do not apply lotions, creams or ointments to incisions.  Showers are allowed - gently wash and pat dry your incisions.   No tub baths or soaking/submerging until cleared by follow up provider.      Diet -   Low fiber/residue diet for 3 weeks. High protein diet-minimum of 50 gms of protein per day.     Activity   No heavy lifting >10 lb for the first week after surgery. Lift nothing heavier than 25 lbs for 4 weeks after surgery.   Walking and non-strenuous exercise promotes good oxygenated blood supply to body tissues and will assist in recovery. Walking and non-strenuous exercise also promotes good lung mechanics and reduces chance of developing pneumonia.     Medications  No driving while taking narcotics/(prescription strength pain medication).  Do not drink alcohol while taking narcotics.  Resume other home medications.     Narcotic pain medication is known to cause constipation as narcotic pain medication slows gut motility. Even if you are not taking oral narcotic pain medication, you have likely been given narcotic pain medication intravenously during your surgical procedure.     Do not get constipated!  No more than 1 day without a bm. If 1 day no bm, then take colace stool softener twice a day. If 24 hours of taking colace stool softener does not produce a bm , then keep taking colace and add miralax laxative twice a day until you have a bm. Once you are having regular bms, you can use colace and/or miralax as needed to ensure you have a regular daily bm.      If problems (fever, signs of infection, uncontrolled bleeding or drainage from surgical incision, uncontrolled pain, uncontrolled nausea and/or vomiting) or questions arise, please call our office at [phone] [facility] Office

## 2024-07-16 NOTE — CARE COORDINATION
Patient has discharge orders to go home today. Patient is aware and in agreement with this discharge plan.     No CM needs voiced or noted.     ASSESSMENT NOTE    Attending Physician: Salud Ngo MD  Admit Problem: Appendicular abscess [K35.33]  Date/Time of Admission: 7/10/2024 12:07 AM  Problem List:  Patient Active Problem List   Diagnosis    Appendiceal abscess    Postoperative abscess    Septicemia (HCC)       Service Assessment  Patient Orientation Alert and Oriented   Cognition Alert   History Provided By Patient   Primary Caregiver     Accompanied By/Relationship     Support Systems Spouse/Significant Other   Patient's Healthcare Decision Maker is: Legal Next of Kin   PCP Verified by CM Yes (confirmed PCP is Dr. Sommer Lopez)   Last Visit to PCP Within last 3 months (last PCP visit was a week ago)   Prior Functional Level Independent in ADLs/IADLs   Current Functional Level Other (see comment) (TBD by clinicals)   Can patient return to prior living arrangement Yes   Ability to make needs known: Good   Family able to assist with home care needs: Yes   Would you like for me to discuss the discharge plan with any other family members/significant others, and if so, who? Yes   Financial Resources Other (Comment) (Patient states she has [employer].)   Community Resources None   CM/SW Referral Other (see comment) (discharge planning)     Social/Functional History  Lives With Spouse   Type of Home House   Home Layout One level   Home Access Stairs to enter without rails   Entrance Stairs - Number of Steps 1 step   Entrance Stairs - Rails     Bathroom Shower/Tub Walk-in shower   Bathroom Toilet Standard   Bathroom Equipment None   Bathroom Accessibility Accessible   Home Equipment None   Receives Help From Family   ADL Assistance Independent   Bath     Dressing     Grooming     Feeding     Toileting     Homemaking Assistance Independent   Meal Prep     Laundry     Vacuuming     Cleaning

## 2024-07-16 NOTE — DISCHARGE SUMMARY
CONVERTED TO OPEN on 7/11/24    Abdominal drain placement  IV ciprofloxacin IV metronidazole    The patient progressed satisfactorily meeting milestones necessary for successful discharge including tolerating a diet, adequate mobility, adequate pain control, and active bowel function. Patient was deemed a good candidate for discharge at the time of morning rounding. They are to follow up as indicated in their provided discharge paperwork. The patient helped develop and voices understanding with the plan of care. They are amenable without reservations at this time to moving forward with discharge.     Condition at Discharge: Good    Discharge Medications:   Current Discharge Medication List        START taking these medications    Details   ciprofloxacin (CIPRO) 500 MG tablet Take 1 tablet by mouth 2 times daily for 7 days  Qty: 14 tablet, Refills: 0      metroNIDAZOLE (FLAGYL) 500 MG tablet Take 1 tablet by mouth 3 times daily for 7 days  Qty: 21 tablet, Refills: 0           CONTINUE these medications which have NOT CHANGED    Details   HYDROcodone-acetaminophen (NORCO) 7.5-325 MG per tablet TAKE 1 TAB EVERY 4 HOURS AS NEEDED FOR PAIN      ondansetron (ZOFRAN) 4 MG tablet TAKE 1 TABLET (4 MG) BY MOUTH EVERY 4 HOURS AS NEEDED FOR NAUSEA AND VOMITING FOR UP TO 7 DAYS               Disposition: home    Discharge Instructions/Follow-up Care:      Follow-up with Dr. Hathaway in 2 weeks (the week of 7/29/24)    Incisions  Keep incisions clean and dry, may remain uncovered.  Do not apply lotions, creams or ointments to incisions.  Showers are allowed - gently wash and pat dry your incisions.   No tub baths or soaking/submerging until cleared by follow up provider.      Diet -   Low fiber/residue diet for 3 weeks. High protein diet-minimum of 50 gms of protein per day.     Activity   No heavy lifting >10 lb for the first week after surgery. Lift nothing heavier than 25 lbs for 4 weeks after surgery.   Walking and

## 2024-07-16 NOTE — PROGRESS NOTES
Hospitalist Progress Note   Admit Date:  7/10/2024 12:07 AM   Name:  Rama Barboza   Age:  58 y.o.  Sex:  female  :  1965   MRN:  [id number]   Room:      Presenting/Chief Complaint: abdominal pain    Reason(s) for Admission: Appendicular abscess [K35.33]     Hospital Course:   Rama Barboza is a 58 y.o. female with medical history of   Recent appendicitis s/p appendectomy      who presented with abdominal pain and fever.     CT shows large abscess around recent surgical site. Distal right ureter is also engulfed by the collection and resulting in mild right hydronephrosis.     Patient underwent exploratory laparotomy on 2024.       Subjective & 24hr Events:     24   Less abdominal pain.   No nausea. No vomiting.   No fever. No shaking. No chills.   No chest pain.   No respiratory distress.   No shortness of breath.        Assessment & Plan:     Principal Problem:    Appendiceal abscess    Postoperative abscess    Septicemia (HCC)  Plan:   Continue Cefepime and Metronidazole.   Monitor culture results and sensitivity report  Symptomatic treatments and pain control.  Follow up more recommendation from surgical service.     I have discussed the plan of care with patient.  .         Anticipated Discharge Arrangements:   Home    PT/OT evals ordered?  Not ordered; patient not expected to need rehab  Diet:  ADULT ORAL NUTRITION SUPPLEMENT; Breakfast, Lunch, Dinner; Standard High Calorie/High Protein Oral Supplement  ADULT DIET; Dysphagia - Soft and Bite Sized; Low Fiber  VTE prophylaxis: SCD's   Code status: Full Code      Non-peripheral Lines and Tubes (if present):      Closed/Suction Drain Left Abdomen Bulb (Active)        Telemetry (if present):           Hospital Problems:  Principal Problem:    Appendiceal abscess  Active Problems:    Postoperative abscess    Septicemia (HCC)  Resolved Problems:    * No resolved hospital problems. *      Objective:   Patient Vitals for the 
       Hospitalist Progress Note   Admit Date:  7/10/2024 12:07 AM   Name:  Rama Barboza   Age:  58 y.o.  Sex:  female  :  1965   MRN:  [id number]   Room:      Presenting/Chief Complaint: No chief complaint on file.     Reason(s) for Admission: Appendicular abscess [K35.33]     Hospital Course:     Copied from prior provider HPI/summary:  Rama Barboza is a 58 y.o. female with medical history of recent appendicitis status post appendectomy who presented with abdominal pain, low-grade fevers ongoing for the past 12 days since her recent appendectomy.  She reports right lower abdominal pain.  She states that she had a decreased appetite and has been feeling intermittently nauseous.  Given her symptoms, she decided to come to the ER for further workup.     In the ER, patient had repeat CT scan done.  CT scan shows evidence of a large abscess approximately 7 x 6 x 10 cm around her recent surgical site.  Distal right ureter is also engulfed by the collection and resulting in mild right hydronephrosis.  Patient was admitted to hospital service for further workup.     IR evaluated the patient and noted that they could not place drain given its location.  Surgery consult was noted and patient to be taken to the OR for ex lap and washout      Subjective & 24hr Events:   Patient seen postop day #1 open laparotomy with drainage of intra-abdominal abscess following laparoscopic appendectomy performed at [facility] per admission history and physical-12 days prior to presentation.  Doing okay postop day #1-surgery has ordered full liquid diet and out of bed to chair.        Assessment & Plan:      Postoperative abscess  Status post recent appendectomy  Patient has appendiceal abscess.  Unable to be drained percutaneously by IR.  Surgery following and planning for ex lap tomorrow.  Now postop open I&D of abdominal abscess  ----cultures no growth today-await operative cultures and admission 
       Hospitalist Progress Note   Admit Date:  7/10/2024 12:07 AM   Name:  Rama Barboza   Age:  58 y.o.  Sex:  female  :  1965   MRN:  [id number]   Room:      Presenting/Chief Complaint: No chief complaint on file.     Reason(s) for Admission: Appendicular abscess [K35.33]     Hospital Course:     Copied from prior provider HPI/summary:  Rama Barboza is a 58 y.o. female with medical history of recent appendicitis status post appendectomy who presented with abdominal pain, low-grade fevers ongoing for the past 12 days since her recent appendectomy.  She reports right lower abdominal pain.  She states that she had a decreased appetite and has been feeling intermittently nauseous.  Given her symptoms, she decided to come to the ER for further workup.     In the ER, patient had repeat CT scan done.  CT scan shows evidence of a large abscess approximately 7 x 6 x 10 cm around her recent surgical site.  Distal right ureter is also engulfed by the collection and resulting in mild right hydronephrosis.  Patient was admitted to hospital service for further workup.     IR evaluated the patient and noted that they could not place drain given its location.  Surgery consult was noted and patient to be taken to the OR for ex lap and washout      Subjective & 24hr Events:   Patient seen postop day #0 open laparotomy with drainage of intra-abdominal abscess following laparoscopic appendectomy performed at [facility] per admission history and physical-12 days prior to presentation.  Vital signs stable postop-no obvious distress or new complaints.        Assessment & Plan:      Postoperative abscess  Status post recent appendectomy  Patient has appendiceal abscess.  Unable to be drained percutaneously by IR.  Surgery following and planning for ex lap tomorrow.  Currently does not meet sepsis criteria.  -Continue with cefepime and Flagyl  -DC Vanco is unlikely to be cause of intra-abdominal abscess.  
       Hospitalist Progress Note   Admit Date:  7/10/2024 12:07 AM   Name:  Rama Barboza   Age:  58 y.o.  Sex:  female  :  1965   MRN:  [id number]   Room:      Presenting/Chief Complaint: No chief complaint on file.     Reason(s) for Admission: Appendicular abscess [K35.33]     Hospital Course:     Copied from prior provider HPI/summary:  Rama Barboza is a 58 y.o. female with medical history of recent appendicitis status post appendectomy who presented with abdominal pain, low-grade fevers ongoing for the past 12 days since her recent appendectomy.  She reports right lower abdominal pain.  She states that she had a decreased appetite and has been feeling intermittently nauseous.  Given her symptoms, she decided to come to the ER for further workup.     In the ER, patient had repeat CT scan done.  CT scan shows evidence of a large abscess approximately 7 x 6 x 10 cm around her recent surgical site.  Distal right ureter is also engulfed by the collection and resulting in mild right hydronephrosis.  Patient was admitted to hospital service for further workup.     IR evaluated the patient and noted that they could not place drain given its location.  Surgery consult was noted and patient to be taken to the OR for ex lap and washout      Subjective & 24hr Events:   Patient seen postop day #3 open laparotomy with drainage of intra-abdominal abscess following laparoscopic appendectomy performed at [facility] per admission history and physical-12 days prior to presentation.  Operative cultures still no growth to date.  Wound culture preop E. coli broadly sensitive but resistant to Cipro.  Clinically stable      Assessment & Plan:      Postoperative abscess  Status post recent appendectomy  Patient has appendiceal abscess.  Unable to be drained percutaneously by IR.  Surgery following and planning for ex lap tomorrow.  Now postop open I&D of abdominal abscess  --- wound culture E. coli 
       Hospitalist Progress Note   Admit Date:  7/10/2024 12:07 AM   Name:  Rama Barboza   Age:  58 y.o.  Sex:  female  :  1965   MRN:  [id number]   Room:      Presenting/Chief Complaint: No chief complaint on file.     Reason(s) for Admission: Appendicular abscess [K35.33]     Hospital Course:     Copied from prior provider HPI/summary:  Rama Barboza is a 58 y.o. female with medical history of recent appendicitis status post appendectomy who presented with abdominal pain, low-grade fevers ongoing for the past 12 days since her recent appendectomy.  She reports right lower abdominal pain.  She states that she had a decreased appetite and has been feeling intermittently nauseous.  Given her symptoms, she decided to come to the ER for further workup.     In the ER, patient had repeat CT scan done.  CT scan shows evidence of a large abscess approximately 7 x 6 x 10 cm around her recent surgical site.  Distal right ureter is also engulfed by the collection and resulting in mild right hydronephrosis.  Patient was admitted to hospital service for further workup.     IR evaluated the patient and noted that they could not place drain given its location.  Surgery consult was noted and patient to be taken to the OR for ex lap and washout      Subjective & 24hr Events:   Patient seen postop day #2 open laparotomy with drainage of intra-abdominal abscess following laparoscopic appendectomy performed at [facility] per admission history and physical-12 days prior to presentation.  Doing okay surgery discussed very slow advance with patient-currently on clear liquids.  Wound culture with E. coli resistant to quinolones but receiving cefepime and metronidazole.  She denies any shaking chills nausea vomiting diarrhea-analgesia is effective.  Denies chest pain or dyspnea.        Assessment & Plan:      Postoperative abscess  Status post recent appendectomy  Patient has appendiceal abscess.  Unable to 
       Hospitalist Progress Note   Admit Date:  7/10/2024 12:07 AM   Name:  Rama Barboza   Age:  58 y.o.  Sex:  female  :  1965   MRN:  [id number]   Room:      Presenting/Chief Complaint: No chief complaint on file.     Reason(s) for Admission: Appendicular abscess [K35.33]     Hospital Course:   Rama Barboza is a 58 y.o. female with medical history of recent appendicitis status post appendectomy who presented with abdominal pain, low-grade fevers ongoing for the past 12 days since her recent appendectomy.  She reports right lower abdominal pain.  She states that she had a decreased appetite and has been feeling intermittently nauseous.  Given her symptoms, she decided to come to the ER for further workup.    In the ER, patient had repeat CT scan done.  CT scan shows evidence of a large abscess approximately 7 x 6 x 10 cm around her recent surgical site.  Distal right ureter is also engulfed by the collection and resulting in mild right hydronephrosis.  Patient was admitted to hospital service for further workup.    IR evaluated the patient and noted that they could not place drain given its location.  Surgery consult was noted and patient to be taken to the OR for ex lap and washout     Subjective & 24hr Events:   Patient was seen and evaluated at bedside this morning.  States that she is still having right lower quadrant tenderness.  Pain is well-controlled currently on medications.  Denies any fevers, chills, chest pain, nausea, vomiting.  No other concerns.      Assessment & Plan:     Postoperative abscess  Status post recent appendectomy  Patient has appendiceal abscess.  Unable to be drained percutaneously by IR.  Surgery following and planning for ex lap tomorrow.  Currently does not meet sepsis criteria.  -Continue with cefepime and Flagyl  -DC Vanco is unlikely to be cause of intra-abdominal abscess.    -N.p.o. after midnight for ex lap  -Follow-up surgery rec  -Pain control as 
       Hospitalist Progress Note   Admit Date:  7/10/2024 12:07 AM   Name:  Rama Barboza   Age:  58 y.o.  Sex:  female  :  1965   MRN:  [id number]   Room:      Presenting/Chief Complaint: No chief complaint on file.     Reason(s) for Admission: Appendicular abscess [K35.33]     Hospital Course:     Copied from prior provider HPI/summary:  Rama Barboza is a 58 y.o. female with medical history of recent appendicitis status post appendectomy who presented with abdominal pain, low-grade fevers ongoing for the past 12 days since her recent appendectomy.  She reports right lower abdominal pain.  She states that she had a decreased appetite and has been feeling intermittently nauseous.  Given her symptoms, she decided to come to the ER for further workup.     In the ER, patient had repeat CT scan done.  CT scan shows evidence of a large abscess approximately 7 x 6 x 10 cm around her recent surgical site.  Distal right ureter is also engulfed by the collection and resulting in mild right hydronephrosis.  Patient was admitted to hospital service for further workup.     IR evaluated the patient and noted that they could not place drain given its location.  Surgery consult was noted and patient to be taken to the OR for ex lap and washout      Subjective & 24hr Events:   Patient seen postop day #4 open laparotomy with drainage of intra-abdominal abscess following laparoscopic appendectomy performed at [facility] per admission history and physical-12 days prior to presentation.  Operative cultures remain negative with wound culture preop showing E. coli fairly sensitive but resistant to quinolone.  Patient progressing-some nausea but receiving IV Flagyl.  She wants to go home as soon as safe.  Awaiting surgical opinion about advancing diet.    Assessment & Plan:      Postoperative abscess  Status post recent appendectomy  Patient has appendiceal abscess.  Unable to be drained percutaneously by 
4 Eyes Skin Assessment     NAME:  Rama Barboza  YOB: 1965  MEDICAL RECORD NUMBER:  [id number]    The patient is being assessed for  Admission    I agree that at least one RN has performed a thorough Head to Toe Skin Assessment on the patient. ALL assessment sites listed below have been assessed.      Areas assessed by both nurses:    Head, Face, Ears, Shoulders, Back, Chest, Arms, Elbows, Hands, Sacrum. Buttock, Coccyx, Ischium, and Legs. Feet and Heels        Does the Patient have a Wound? No noted wound(s), x3 abd lap sites         William Prevention initiated by RN: No  Wound Care Orders initiated by RN: No    Pressure Injury (Stage 3,4, Unstageable, DTI, NWPT, and Complex wounds) if present, place Wound referral order by RN under : No    New Ostomies, if present place, Ostomy referral order under : No     Nurse 1 eSignature: Electronically signed by Clarice Gary RN on 7/10/24 at 12:39 AM EDT    **SHARE this note so that the co-signing nurse can place an eSignature**    Nurse 2 eSignature: Electronically signed by Sonja Lovell RN on 7/10/24 at 7:35 AM EDT    
4 Eyes Skin Assessment     NAME:  Rama Barboza  YOB: 1965  MEDICAL RECORD NUMBER:  [id number]    The patient is being assessed for  Post-Op Surgical    I agree that at least one RN has performed a thorough Head to Toe Skin Assessment on the patient. ALL assessment sites listed below have been assessed.      Areas assessed by both nurses:    Head, Face, Ears, Shoulders, Back, Chest, Arms, Elbows, Hands, Sacrum. Buttock, Coccyx, Ischium, and Legs. Feet and Heels        Does the Patient have a Wound? No noted wound(s)       William Prevention initiated by RN: Yes  Wound Care Orders initiated by RN: No    Pressure Injury (Stage 3,4, Unstageable, DTI, NWPT, and Complex wounds) if present, place Wound referral order by RN under : No    New Ostomies, if present place, Ostomy referral order under : No     Nurse 1 eSignature: Electronically signed by Yvette Conroy RN on 7/11/24 at 2:38 PM EDT    **SHARE this note so that the co-signing nurse can place an eSignature**    Nurse 2 eSignature: Electronically signed by Kath Early RN on 7/11/24 at 5:22 PM EDT   
Admit Date: 7/10/2024    POD 5 Days Post-Op    Procedure:  Procedure(s):  LAPAROSCOPY DIAGNOSTIC WITH INTRAABDOMINAL ABSCESS DRAINAGE CONVERTED TO OPEN    Subjective:     A/ox 4 with NAD noted.  Respirations even and unlabored on room air.  Tolerating soft and bite-size low fiber diet with no nausea or vomiting.  Positive flatus.  Last BM 7/15/2024.  Voiding.  Afebrile.  VSS.    ABD pain 2/10 at worst and 0/10 at best.    Objective:       Vitals:    07/15/24 2259 24 0250 24 0718 24 1057   BP: 119/79 127/85 135/82 109/86   Pulse: 85 79 84 94   Resp: 18 18 16 16   Temp: 98.4 °F (36.9 °C) 98.4 °F (36.9 °C) 98.2 °F (36.8 °C) 97.7 °F (36.5 °C)   TempSrc: Oral Oral     SpO2: 97% 98% 97% 98%   Weight:       Height:           Temp (24hrs), Av.1 °F (36.7 °C), Min:97.3 °F (36.3 °C), Max:98.6 °F (37 °C)  .  I&O reviewed as documented.    Physical Exam  Constitutional:       General: She is not in acute distress.  HENT:      Mouth/Throat:      Mouth: Mucous membranes are moist.   Cardiovascular:      Rate and Rhythm: Normal rate and regular rhythm.      Pulses: Normal pulses.      Heart sounds: Normal heart sounds. No murmur heard.     No friction rub. No gallop.   Pulmonary:      Effort: Pulmonary effort is normal. No respiratory distress.      Breath sounds: Normal breath sounds.   Abdominal:      General: Bowel sounds are normal. There is no distension.      Palpations: Abdomen is soft.   Genitourinary:     Comments: voiding  Musculoskeletal:         General: No swelling. Normal range of motion.      Cervical back: No rigidity.   Skin:     General: Skin is warm and dry.      Capillary Refill: Capillary refill takes less than 2 seconds.      Comments: Midline abdominal incision and RLQ ABD drain with no signs of infection.   Neurological:      Mental Status: She is alert and oriented to person, place, and time.   Psychiatric:         Behavior: Behavior normal.          Labs:   Recent Results (from the 
END OF SHIFT NOTE:    INTAKE/OUTPUT  07/13 0701 - 07/14 0700  In: 700 [P.O.:700]  Out: 745 [Urine:400; Drains:345]  Voiding: Yes  Catheter: No  Drain:   Closed/Suction Drain Left Abdomen Bulb (Active)   Site Description Clean, dry & intact 07/14/24 0839   Dressing Status New dressing applied 07/14/24 1141   Drainage Appearance Green 07/14/24 1427   Drain Status To bulb suction;Compressed 07/14/24 1427   Output (ml) 10 ml 07/14/24 1427       Flatus: Patient does have flatus present.    Stool: 5 occurrences.    Characteristics:  Stool Appearance: Watery (per patient)  Stool Color: Brown  Stool Amount: Large  Stool Assessment  Incontinence: No  Stool Appearance: Watery (per patient)  Stool Color: Brown  Stool Amount: Large  Stool Source: Rectum  Last BM (including prior to admit): 07/14/24    Emesis: 0 occurrences.    Characteristics:        VITAL SIGNS  Patient Vitals for the past 12 hrs:   Temp Pulse Resp BP SpO2   07/14/24 1854 98.2 °F (36.8 °C) 70 18 134/88 98 %   07/14/24 1525 98.2 °F (36.8 °C) 86 17 (!) 142/90 98 %   07/14/24 1113 98.2 °F (36.8 °C) 88 16 (!) 140/86 97 %   07/14/24 0707 97.7 °F (36.5 °C) 90 18 (!) 127/90 95 %       Pain Assessment  Pain Level: 2 (07/14/24 1511)  Pain Location: Abdomen  Patient's Stated Pain Goal: 0 - No pain    Ambulating  Yes multiple laps in hallway    Shift report given to oncoming nurse at the bedside.    Alcira Underwood, RN     
END OF SHIFT NOTE:    INTAKE/OUTPUT  07/15 0701 - 07/16 0700  In: 420 [P.O.:420]  Out: 2352 [Urine:2350; Drains:2]  Voiding: Yes  Catheter: No  Drain:   Closed/Suction Drain Left Abdomen Bulb (Active)   Site Description Clean, dry & intact 07/16/24 0711   Dressing Status Clean, dry & intact 07/16/24 0711   Drainage Appearance Other (Comment) 07/16/24 0711   Drain Status Compressed;To bulb suction 07/15/24 1951   Output (ml) 5 ml 07/16/24 0711               Flatus: Patient does have flatus present.    Stool: 1 occurrences.    Characteristics:  Stool Appearance: Watery (per patient)  Stool Color: Brown  Stool Amount: Large  Stool Assessment  Incontinence: No  Stool Appearance: Watery (per patient)  Stool Color: Brown  Stool Amount: Large  Stool Source: Rectum  Last BM (including prior to admit): 07/15/24 (per pt)    Emesis: 0 occurrences.    Characteristics:        VITAL SIGNS  Patient Vitals for the past 12 hrs:   Temp Pulse Resp BP SpO2   07/16/24 0250 98.4 °F (36.9 °C) 79 18 127/85 98 %   07/15/24 2259 98.4 °F (36.9 °C) 85 18 119/79 97 %   07/15/24 1951 -- -- -- -- 98 %   07/15/24 1923 98.6 °F (37 °C) 94 18 115/68 98 %       Pain Assessment  Pain Level: 2 (07/15/24 2021)  Pain Location: Abdomen  Patient's Stated Pain Goal: 0 - No pain    Ambulating  Yes    Shift report given to oncoming nurse at the bedside.    JAYSHREE SPANGLER RN    
General Surgery Progress Note    7/12/2024    Admit Date: 7/10/2024    Subjective:   Surgery POD #1  The patient feels \"better\" this morning. The \"sick feeling\" she has had for the past 10 days is gone. No flatus or BM yet. Tolerated clears yesterday.     Objective:     BP 92/64   Pulse 55   Temp 97.3 °F (36.3 °C) (Oral)   Resp 17   Ht 1.549 m (5' 1\")   Wt 64.9 kg (143 lb)   SpO2 98%   BMI 27.02 kg/m²       Intake/Output Summary (Last 24 hours) at 7/12/2024 0832  Last data filed at 7/12/2024 0710  Gross per 24 hour   Intake 1946.17 ml   Output 930 ml   Net 1016.17 ml        EXAM:  ABD soft, incisional tenderness, active BS'S. Wounds intact. Jorge drain with serosanguinous material.        Data Review    Recent Results (from the past 24 hour(s))   Culture, Wound    Collection Time: 07/11/24 12:11 PM    Specimen: Abscess    INTRA   Result Value Ref Range    Special Requests NO SPECIAL REQUESTS      Gram Stain PENDING     Culture (A)       SCANT Gram negative rods IDENTIFICATION AND SUSCEPTIBILITY TO FOLLOW        Principal Problem:    Appendiceal abscess  Active Problems:    Postoperative abscess    Septicemia (HCC)  Resolved Problems:    * No resolved hospital problems. *        Plan: 1. Maxipime/Flagyl  2. Stop oxygen  3. Full liquid diet  4. Reglan  5. OOB/IS/Lovenox  6. Teach patient how to flush/care for drain.   MD Rivas.  
General Surgery Progress Note    7/13/2024    Admit Date: 7/10/2024    Subjective:   Surgery POD #2    The patient has some bilious material in the drain this morning. When I asked IR to evaluate the abscess for percutaneous drainage, Dr. Victor said he could \"almost guarantee\" that she had a fistulous tract from her cecum into the abscess cavity. It looks like he was right.     Objective:     /73   Pulse 87   Temp 98.6 °F (37 °C) (Oral)   Resp 16   Ht 1.549 m (5' 1\")   Wt 64.9 kg (143 lb)   SpO2 96%   BMI 27.02 kg/m²       Intake/Output Summary (Last 24 hours) at 7/13/2024 0934  Last data filed at 7/13/2024 0722  Gross per 24 hour   Intake --   Output 1408 ml   Net -1408 ml        EXAM:  ABD soft, incisional tenderness, active BS'S. Wounds are clean, dry and intact. Bilious material in the Jorge drain.        Data Review    Recent Results (from the past 24 hour(s))   Basic Metabolic Panel    Collection Time: 07/13/24  4:00 AM   Result Value Ref Range    Sodium 137 136 - 145 mmol/L    Potassium 4.0 3.5 - 5.1 mmol/L    Chloride 108 (H) 98 - 107 mmol/L    CO2 21 20 - 28 mmol/L    Anion Gap 8 (L) 9 - 18 mmol/L    Glucose 119 (H) 70 - 99 mg/dL    BUN 10 6 - 23 MG/DL    Creatinine 0.53 (L) 0.60 - 1.10 MG/DL    Est, Glom Filt Rate >90 >60 ml/min/1.73m2    Calcium 8.1 (L) 8.8 - 10.2 MG/DL   CBC with Auto Differential    Collection Time: 07/13/24  4:00 AM   Result Value Ref Range    WBC 9.3 4.3 - 11.1 K/uL    RBC 3.35 (L) 4.05 - 5.2 M/uL    Hemoglobin 9.7 (L) 11.7 - 15.4 g/dL    Hematocrit 29.6 (L) 35.8 - 46.3 %    MCV 88.4 82 - 102 FL    MCH 29.0 26.1 - 32.9 PG    MCHC 32.8 31.4 - 35.0 g/dL    RDW 12.0 11.9 - 14.6 %    Platelets 341 150 - 450 K/uL    MPV 9.0 (L) 9.4 - 12.3 FL    nRBC 0.00 0.0 - 0.2 K/uL    Differential Type AUTOMATED      Neutrophils % 82 (H) 43 - 78 %    Lymphocytes % 10 (L) 13 - 44 %    Monocytes % 4 4.0 - 12.0 %    Eosinophils % 2 0.5 - 7.8 %    Basophils % 1 0.0 - 2.0 %    Immature 
General Surgery Progress Note    7/15/2024    Admit Date: 7/10/2024    Subjective:   Surgery POD #4  The patient feels \"much better\" over the past 2 days. She had 345 cc's out of her drain 2 days ago, but only 55 cc's yesterday. She had six bowel movements yesterday. Voiding without problems.     Objective:     BP (!) 140/88   Pulse 74   Temp 98.1 °F (36.7 °C) (Oral)   Resp 18   Ht 1.549 m (5' 1\")   Wt 64.9 kg (143 lb)   SpO2 98%   BMI 27.02 kg/m²       Intake/Output Summary (Last 24 hours) at 7/15/2024 1055  Last data filed at 7/15/2024 1036  Gross per 24 hour   Intake 2166.63 ml   Output 3155 ml   Net -988.37 ml        EXAM:  ABD soft, no tenderness to palpation, active BS'S. +BM'S. Drain with small amount of bilious drainage.        Data Review  No results found for this or any previous visit (from the past 24 hour(s)).     Principal Problem:    Appendiceal abscess  Active Problems:    Postoperative abscess    Septicemia (HCC)  Resolved Problems:    * No resolved hospital problems. *        Plan: 1. Soft diet  2. Continue antibiotics  3. Check blood work 7/16/24  4. OOB/Lovenox/IS  5. Possibly home in the next 1-2 days, if continues to do well.  MD Rivas.  
Pt with change in drainage color Surgical Np made aware came to speak with pt and informed not to flush drain.  
Pt's D/C instructions completed.  Verbalized understanding of all instructions including diet, activity, s/sx to alert MD, medications, wound care, and f/u appointment.  Family at BS.     
TRANSFER - IN REPORT:    Verbal report received from PRAVIN Sheldon on Rama Barboza  being received from PACU for routine progression of patient care      Report consisted of patient's Situation, Background, Assessment and   Recommendations(SBAR).     Information from the following report(s) Index, Adult Overview, Surgery Report, and MAR was reviewed with the receiving nurse.    Opportunity for questions and clarification was provided.      Assessment completed upon patient's arrival to unit and care assumed.    
VANCO DAILY FOLLOW UP NOTE  [facility]   Pharmacy Pharmacokinetic Monitoring Service - Vancomycin    Consulting Provider: Gill   Indication: intra-abdomina abscess s/p lap appendectomy  Target Concentration: Goal AUC/JENN 400-600 mg*hr/L  Day of Therapy: 2  Additional Antimicrobials: metronidazole, cefepime    Pertinent Laboratory Values:   Wt Readings from Last 1 Encounters:   07/10/24 65.1 kg (143 lb 9.6 oz)     Temp Readings from Last 1 Encounters:   07/10/24 98.2 °F (36.8 °C) (Oral)     Recent Labs     07/09/24  1817   BUN 10   CREATININE 0.65   WBC 14.5*   PROCAL 0.06   LACTA 0.9     Estimated Creatinine Clearance: 83 mL/min (based on SCr of 0.65 mg/dL).    No results found for: \"VANCOTROUGH\", \"VANCORANDOM\"    MRSA Nasal Swab: N/A. Non-respiratory infection    Assessment:  Date/Time Dose Concentration AUC         Note: Serum concentrations collected for AUC dosing may appear elevated if collected in close proximity to the dose administered, this is not necessarily an indication of toxicity    Plan:  Dosing recommendations based on Bayesian software  Start vancomycin 1000mg q12h  Anticipated AUC of 517 and trough concentration of 13.9 at steady state  Renal labs as indicated   Vancomycin concentrations will be ordered as clinically appropriate  Pharmacy will continue to monitor patient and adjust therapy as indicated    Thank you for the consult,  Shandra Sams, PharmD, BCPS        
VANCO DAILY FOLLOW UP NOTE  [facility]   Pharmacy Pharmacokinetic Monitoring Service - Vancomycin    Consulting Provider: Gill   Indication: IAI  Target Concentration: Goal AUC/JENN 400-600 mg*hr/L  Day of Therapy: 1  Additional Antimicrobials: metronidazole, cefepime    Pertinent Laboratory Values:   Wt Readings from Last 1 Encounters:   07/10/24 65.1 kg (143 lb 9.6 oz)     Temp Readings from Last 1 Encounters:   07/10/24 98.2 °F (36.8 °C) (Oral)     Recent Labs     07/09/24  1817   BUN 10   CREATININE 0.65   WBC 14.5*   PROCAL 0.06   LACTA 0.9     Estimated Creatinine Clearance: 83 mL/min (based on SCr of 0.65 mg/dL).    No results found for: \"VANCOTROUGH\", \"VANCORANDOM\"    MRSA Nasal Swab: N/A. Non-respiratory infection    Assessment:  Date/Time Dose Concentration AUC         Note: Serum concentrations collected for AUC dosing may appear elevated if collected in close proximity to the dose administered, this is not necessarily an indication of toxicity    Plan:  Dosing recommendations based on Bayesian software  Start vancomycin 1000mg q12h  Anticipated AUC of 517 and trough concentration of 13.9 at steady state  Renal labs as indicated   Vancomycin concentrations will be ordered as clinically appropriate  Pharmacy will continue to monitor patient and adjust therapy as indicated    Thank you for the consult,  Ольга Nye [facility]    
0.0 - 5.0 %    Neutrophils Absolute 5.2 1.7 - 8.2 K/UL    Lymphocytes Absolute 0.9 0.5 - 4.6 K/UL    Monocytes Absolute 0.4 0.1 - 1.3 K/UL    Eosinophils Absolute 0.3 0.0 - 0.8 K/UL    Basophils Absolute 0.1 0.0 - 0.2 K/UL    Immature Granulocytes Absolute 0.0 0.0 - 0.5 K/UL        Principal Problem:    Appendiceal abscess  Active Problems:    Postoperative abscess    Septicemia (HCC)  Resolved Problems:    * No resolved hospital problems. *        Plan:   1. Maxipime/Flagyl  2. Clear liquids  3. Repeat blood work 7/15/24.  4. OOB/IS/Lovenox  5. OOB/ ambulate    Kimberly Frommel, NP

## 2024-07-16 NOTE — PLAN OF CARE
Problem: Discharge Planning  Goal: Discharge to home or other facility with appropriate resources  7/15/2024 2227 by Thalia Maynard RN  Outcome: Progressing  7/15/2024 1720 by Angie Arce RN  Outcome: Progressing     Problem: Pain  Goal: Verbalizes/displays adequate comfort level or baseline comfort level  7/15/2024 2227 by Thalia Maynard RN  Outcome: Progressing  Flowsheets (Taken 7/15/2024 1951)  Verbalizes/displays adequate comfort level or baseline comfort level: Encourage patient to monitor pain and request assistance  7/15/2024 1720 by Angie Arce RN  Outcome: Progressing     Problem: Safety - Adult  Goal: Free from fall injury  7/15/2024 2227 by Thalia Maynard RN  Outcome: Progressing  Flowsheets (Taken 7/15/2024 1951)  Free From Fall Injury: Instruct family/caregiver on patient safety  7/15/2024 1720 by Angie Arce RN  Outcome: Progressing     Problem: Nutrition Deficit:  Goal: Optimize nutritional status  7/15/2024 2227 by Thalia Maynard RN  Outcome: Progressing  7/15/2024 1720 by Angie Arce RN  Outcome: Progressing

## 2024-07-17 LAB
BACTERIA SPEC CULT: ABNORMAL
BACTERIA SPEC CULT: ABNORMAL
ORGANISM ID: NORMAL
SERVICE CMNT-IMP: ABNORMAL

## 2024-07-26 NOTE — PROGRESS NOTES
poDate: 2024      Name: Rama Barboza      MRN: [id number]       : 1965       Age: 58 y.o.    Sex: female        Sommer Lopez MD       CC:  No chief complaint on file.      HPI:  The patient presents for the first post-op visit s/p diagnostic laparoscopy converted to an open RLQ abscess drainage done on 24. The patient originally had a laparoscopic appendectomy done on 24 at [facility]. Her discharge summary was as follows:    Admit date: 7/10/2024     Discharge date: 2024  Attending Physician: Salud Ngo MD  Primary Discharge Diagnosis:   Principal Problem:    Appendiceal abscess  Active Problems:    Postoperative abscess    Septicemia (HCC)  Resolved Problems:    * No resolved hospital problems. *     Primary Operations or Procedures Performed :  Procedure(s):  LAPAROSCOPY DIAGNOSTIC WITH INTRAABDOMINAL ABSCESS DRAINAGE CONVERTED TO OPEN      Brief History and Reason for Admission: Rama Barboza was admitted with the following history of present illness.  7/10/24 HPI: Rama Barboza is a 58 y.o. female with a past medical history of chronic back pain and migraine headaches who presented to the ED 24 with C/o Right Lower quadrant abdominal pain and low grade fevers since recent Appendectomy. Pt is S/p Laparoscopic Appendectomy 24 by Dr Manish Gonzáles at [facility]. Pt does not wish to return to [facility] for treatment. Pt admitted by Hospitalist. IR consulted to evaluate for drainage of abdominal abscess. General Surgery also consulted.      Labs: WBC 14.5 24 CT Abdomen & Pelvis  IMPRESSION:  1.  Phlegmonous changes with developing abscess are noted in the right lower  quadrant, surrounding the surgical clips. The collection measures 7 x 6 x 10 cm.  2.   The distal right ureter is engulfed by the collection. There is resultant  mild right hydronephrosis.  3.  There are bowel loops draped along the medial margin of

## 2024-07-30 ENCOUNTER — OFFICE VISIT (OUTPATIENT)
Dept: SURGERY | Age: 59
End: 2024-07-30

## 2024-07-30 DIAGNOSIS — K35.33 APPENDICEAL ABSCESS: Primary | ICD-10-CM

## 2024-07-30 DIAGNOSIS — T81.49XA POSTOPERATIVE ABSCESS: ICD-10-CM

## 2024-07-30 PROCEDURE — 99024 POSTOP FOLLOW-UP VISIT: CPT | Performed by: SURGERY

## 2024-08-03 NOTE — PROGRESS NOTES
is a 58 y.o. female who is s/p diagnostic laparoscopy converted to an open RLQ abscess drainage done on 7/11/24. The patient originally had a laparoscopic appendectomy done on 6/27/24 at [facility].    1. Remove all of the staples, POD #26.    2. May resume all pre-op activities    3. Follow-up prn.    SANTOS BECKFORD MD  [facility]   8/3/2024  5:04 PM

## 2024-08-06 ENCOUNTER — OFFICE VISIT (OUTPATIENT)
Dept: SURGERY | Age: 59
End: 2024-08-06

## 2024-08-06 DIAGNOSIS — K35.33 APPENDICEAL ABSCESS: Primary | ICD-10-CM

## 2024-08-06 PROCEDURE — 99024 POSTOP FOLLOW-UP VISIT: CPT | Performed by: SURGERY

## 2024-08-08 LAB
FUNGAL CULT/SMEAR: NORMAL
FUNGUS (MYCOLOGY) CULTURE: NEGATIVE
FUNGUS SMEAR: NORMAL
REFLEX TO ID: NORMAL
SPECIMEN PROCESSING: NORMAL
SPECIMEN SOURCE: NORMAL
SPECIMEN SOURCE: NORMAL

## 2024-08-12 DIAGNOSIS — R10.31 RLQ ABDOMINAL PAIN: Primary | ICD-10-CM

## 2024-08-12 DIAGNOSIS — Z87.898 HISTORY OF ABDOMINAL ABSCESS: Primary | ICD-10-CM

## 2025-01-06 SDOH — HEALTH STABILITY: PHYSICAL HEALTH: ON AVERAGE, HOW MANY DAYS PER WEEK DO YOU ENGAGE IN MODERATE TO STRENUOUS EXERCISE (LIKE A BRISK WALK)?: 3 DAYS

## 2025-01-06 SDOH — HEALTH STABILITY: PHYSICAL HEALTH: ON AVERAGE, HOW MANY MINUTES DO YOU ENGAGE IN EXERCISE AT THIS LEVEL?: 30 MIN

## 2025-01-07 ENCOUNTER — OFFICE VISIT (OUTPATIENT)
Dept: FAMILY MEDICINE CLINIC | Facility: CLINIC | Age: 60
End: 2025-01-07
Payer: COMMERCIAL

## 2025-01-07 VITALS
WEIGHT: 152 LBS | HEART RATE: 68 BPM | DIASTOLIC BLOOD PRESSURE: 76 MMHG | OXYGEN SATURATION: 98 % | TEMPERATURE: 98.4 F | BODY MASS INDEX: 28.72 KG/M2 | SYSTOLIC BLOOD PRESSURE: 120 MMHG

## 2025-01-07 DIAGNOSIS — N95.1 HOT FLASHES DUE TO MENOPAUSE: ICD-10-CM

## 2025-01-07 DIAGNOSIS — Z00.00 ENCOUNTER FOR ROUTINE ADULT HEALTH EXAMINATION WITHOUT ABNORMAL FINDINGS: ICD-10-CM

## 2025-01-07 DIAGNOSIS — Z12.11 COLON CANCER SCREENING: ICD-10-CM

## 2025-01-07 DIAGNOSIS — Z13.1 SCREENING FOR DIABETES MELLITUS: ICD-10-CM

## 2025-01-07 DIAGNOSIS — Z12.31 SCREENING MAMMOGRAM, ENCOUNTER FOR: ICD-10-CM

## 2025-01-07 DIAGNOSIS — Z00.00 ENCOUNTER FOR ROUTINE ADULT HEALTH EXAMINATION WITHOUT ABNORMAL FINDINGS: Primary | ICD-10-CM

## 2025-01-07 DIAGNOSIS — Z83.3 FAMILY HISTORY OF DIABETES MELLITUS: ICD-10-CM

## 2025-01-07 PROBLEM — G47.01 INSOMNIA DUE TO MEDICAL CONDITION: Status: ACTIVE | Noted: 2022-09-14

## 2025-01-07 PROBLEM — A41.9 SEPTICEMIA (HCC): Status: RESOLVED | Noted: 2024-07-10 | Resolved: 2025-01-07

## 2025-01-07 LAB
ALBUMIN SERPL-MCNC: 3.9 G/DL (ref 3.5–5)
ALBUMIN/GLOB SERPL: 1.1 (ref 1–1.9)
ALP SERPL-CCNC: 99 U/L (ref 35–104)
ALT SERPL-CCNC: 33 U/L (ref 8–45)
ANION GAP SERPL CALC-SCNC: 10 MMOL/L (ref 7–16)
AST SERPL-CCNC: 31 U/L (ref 15–37)
BASOPHILS # BLD: 0.03 K/UL (ref 0–0.2)
BASOPHILS NFR BLD: 0.7 % (ref 0–2)
BILIRUB SERPL-MCNC: 0.4 MG/DL (ref 0–1.2)
BUN SERPL-MCNC: 12 MG/DL (ref 6–23)
CALCIUM SERPL-MCNC: 9.8 MG/DL (ref 8.8–10.2)
CHLORIDE SERPL-SCNC: 104 MMOL/L (ref 98–107)
CHOLEST SERPL-MCNC: 210 MG/DL (ref 0–200)
CO2 SERPL-SCNC: 28 MMOL/L (ref 20–29)
CREAT SERPL-MCNC: 0.73 MG/DL (ref 0.6–1.1)
DIFFERENTIAL METHOD BLD: ABNORMAL
EOSINOPHIL # BLD: 0.18 K/UL (ref 0–0.8)
EOSINOPHIL NFR BLD: 4 % (ref 0.5–7.8)
ERYTHROCYTE [DISTWIDTH] IN BLOOD BY AUTOMATED COUNT: 12.8 % (ref 11.9–14.6)
EST. AVERAGE GLUCOSE BLD GHB EST-MCNC: 114 MG/DL
GLOBULIN SER CALC-MCNC: 3.4 G/DL (ref 2.3–3.5)
GLUCOSE SERPL-MCNC: 90 MG/DL (ref 70–99)
HBA1C MFR BLD: 5.6 % (ref 0–5.6)
HCT VFR BLD AUTO: 41.2 % (ref 35.8–46.3)
HDLC SERPL-MCNC: 73 MG/DL (ref 40–60)
HDLC SERPL: 2.9 (ref 0–5)
HGB BLD-MCNC: 13.8 G/DL (ref 11.7–15.4)
IMM GRANULOCYTES # BLD AUTO: 0.01 K/UL (ref 0–0.5)
IMM GRANULOCYTES NFR BLD AUTO: 0.2 % (ref 0–5)
LDLC SERPL CALC-MCNC: 121 MG/DL (ref 0–100)
LYMPHOCYTES # BLD: 1.24 K/UL (ref 0.5–4.6)
LYMPHOCYTES NFR BLD: 27.9 % (ref 13–44)
MCH RBC QN AUTO: 29 PG (ref 26.1–32.9)
MCHC RBC AUTO-ENTMCNC: 33.5 G/DL (ref 31.4–35)
MCV RBC AUTO: 86.6 FL (ref 82–102)
MONOCYTES # BLD: 0.42 K/UL (ref 0.1–1.3)
MONOCYTES NFR BLD: 9.4 % (ref 4–12)
NEUTS SEG # BLD: 2.57 K/UL (ref 1.7–8.2)
NEUTS SEG NFR BLD: 57.8 % (ref 43–78)
NRBC # BLD: 0 K/UL (ref 0–0.2)
PLATELET # BLD AUTO: 255 K/UL (ref 150–450)
PMV BLD AUTO: 9.2 FL (ref 9.4–12.3)
POTASSIUM SERPL-SCNC: 4 MMOL/L (ref 3.5–5.1)
PROT SERPL-MCNC: 7.3 G/DL (ref 6.3–8.2)
RBC # BLD AUTO: 4.76 M/UL (ref 4.05–5.2)
SODIUM SERPL-SCNC: 143 MMOL/L (ref 136–145)
TRIGL SERPL-MCNC: 77 MG/DL (ref 0–150)
TSH, 3RD GENERATION: 2.53 UIU/ML (ref 0.27–4.2)
VLDLC SERPL CALC-MCNC: 15 MG/DL (ref 6–23)
WBC # BLD AUTO: 4.5 K/UL (ref 4.3–11.1)

## 2025-01-07 PROCEDURE — 99386 PREV VISIT NEW AGE 40-64: CPT | Performed by: FAMILY MEDICINE

## 2025-01-07 SDOH — ECONOMIC STABILITY: FOOD INSECURITY: WITHIN THE PAST 12 MONTHS, YOU WORRIED THAT YOUR FOOD WOULD RUN OUT BEFORE YOU GOT MONEY TO BUY MORE.: NEVER TRUE

## 2025-01-07 SDOH — ECONOMIC STABILITY: FOOD INSECURITY: WITHIN THE PAST 12 MONTHS, THE FOOD YOU BOUGHT JUST DIDN'T LAST AND YOU DIDN'T HAVE MONEY TO GET MORE.: NEVER TRUE

## 2025-01-07 SDOH — ECONOMIC STABILITY: INCOME INSECURITY: HOW HARD IS IT FOR YOU TO PAY FOR THE VERY BASICS LIKE FOOD, HOUSING, MEDICAL CARE, AND HEATING?: NOT HARD AT ALL

## 2025-01-07 ASSESSMENT — PATIENT HEALTH QUESTIONNAIRE - PHQ9
SUM OF ALL RESPONSES TO PHQ QUESTIONS 1-9: 2
SUM OF ALL RESPONSES TO PHQ9 QUESTIONS 1 & 2: 2
SUM OF ALL RESPONSES TO PHQ QUESTIONS 1-9: 2
1. LITTLE INTEREST OR PLEASURE IN DOING THINGS: SEVERAL DAYS
2. FEELING DOWN, DEPRESSED OR HOPELESS: SEVERAL DAYS

## 2025-01-07 ASSESSMENT — ENCOUNTER SYMPTOMS
SHORTNESS OF BREATH: 0
CHEST TIGHTNESS: 0
WHEEZING: 0

## 2025-01-07 NOTE — ASSESSMENT & PLAN NOTE
-discussed risks and benefits of hormone replacement  -prior hysterectomy.  No progesterone needed  -start estradiol 1 mg daily

## 2025-01-07 NOTE — PROGRESS NOTES
Rama Barboza is a 59 y.o. female who presents today for the following:  Chief Complaint   Patient presents with    New Patient     Pt presents today for new pt visit establishing care.        Allergies   Allergen Reactions    Penicillins Other (See Comments) and Rash     Since infancy, pt unsure of reaction; Patient states, \" I have taken Keflex without any problem.\"       No current outpatient medications on file.     Current Facility-Administered Medications   Medication Dose Route Frequency Provider Last Rate Last Admin    diatrizoate meglumine-sodium (GASTROGRAFIN) 66-10 % solution 30 mL  30 mL Oral ONCE PRN    30 mL at 08/13/24 0740       Past Medical History:   Diagnosis Date    Chronic pain     back    Fibrocystic breast     Hypothyroidism        Past Surgical History:   Procedure Laterality Date    BREAST BIOPSY      BREAST LUMPECTOMY  1990?    right breast    HYSTERECTOMY (CERVIX STATUS UNKNOWN)  5/2009    Supracervical    LAPAROSCOPY N/A 7/11/2024    LAPAROSCOPY DIAGNOSTIC WITH INTRAABDOMINAL ABSCESS DRAINAGE CONVERTED TO OPEN performed by Gustavo Hathaway MD at [facility] MAIN OR    NEUROLOGICAL SURGERY      laminectomy       Social History     Tobacco Use    Smoking status: Never    Smokeless tobacco: Never   Substance Use Topics    Alcohol use: No     Alcohol/week: 1.7 standard drinks of alcohol        Family History   Problem Relation Age of Onset    Parkinson's Disease Father     Hypertension Father     Gall Bladder Disease Father     Hypertension Mother     Diabetes Mother     Lupus Mother     Breast Cancer Neg Hx     Heart Attack Maternal Grandfather        Patient is here today for annual physical.  Patient has the following chronic diseases:  -osteoarthritis/trigger finger: in right middle finger.   -left hip pain: reports chronic hip pain.  MRI was normal. Takes aleve twice a week.   -hx of appendicitis with abscess: occurred 08/2024. She continues to experience intermittent abdominal

## 2025-01-15 ENCOUNTER — PATIENT MESSAGE (OUTPATIENT)
Dept: FAMILY MEDICINE CLINIC | Facility: CLINIC | Age: 60
End: 2025-01-15

## 2025-01-15 DIAGNOSIS — N95.1 HOT FLASHES DUE TO MENOPAUSE: Primary | ICD-10-CM

## 2025-01-15 RX ORDER — ESTRADIOL 1 MG/1
1 TABLET ORAL DAILY
Qty: 90 TABLET | Refills: 3 | Status: SHIPPED | OUTPATIENT
Start: 2025-01-15

## 2025-04-04 ENCOUNTER — HOSPITAL ENCOUNTER (OUTPATIENT)
Dept: MAMMOGRAPHY | Age: 60
Discharge: HOME | End: 2025-04-04
Attending: FAMILY MEDICINE
Payer: COMMERCIAL

## 2025-04-04 VITALS — BODY MASS INDEX: 28.32 KG/M2 | WEIGHT: 150 LBS | HEIGHT: 61 IN

## 2025-04-04 DIAGNOSIS — Z12.31 SCREENING MAMMOGRAM, ENCOUNTER FOR: ICD-10-CM

## 2025-04-04 PROCEDURE — 77063 BREAST TOMOSYNTHESIS BI: CPT

## 2025-04-15 ENCOUNTER — RESULTS FOLLOW-UP (OUTPATIENT)
Dept: FAMILY MEDICINE CLINIC | Facility: CLINIC | Age: 60
End: 2025-04-15

## 2025-04-29 ENCOUNTER — HOSPITAL ENCOUNTER (OUTPATIENT)
Dept: MAMMOGRAPHY | Age: 60
Discharge: HOME | End: 2025-05-02
Attending: FAMILY MEDICINE
Payer: COMMERCIAL

## 2025-04-29 DIAGNOSIS — R92.8 ABNORMAL SCREENING MAMMOGRAM: ICD-10-CM

## 2025-04-29 PROCEDURE — G0279 TOMOSYNTHESIS, MAMMO: HCPCS

## 2025-04-29 PROCEDURE — 76642 ULTRASOUND BREAST LIMITED: CPT

## 2025-04-30 ENCOUNTER — RESULTS FOLLOW-UP (OUTPATIENT)
Dept: FAMILY MEDICINE CLINIC | Facility: CLINIC | Age: 60
End: 2025-04-30

## 2025-07-01 DIAGNOSIS — N95.1 HOT FLASHES DUE TO MENOPAUSE: ICD-10-CM

## 2025-07-06 RX ORDER — ESTRADIOL 1 MG/1
1 TABLET ORAL DAILY
Qty: 90 TABLET | Refills: 3 | Status: SHIPPED | OUTPATIENT
Start: 2025-07-06

## 2025-08-14 ENCOUNTER — OFFICE VISIT (OUTPATIENT)
Dept: FAMILY MEDICINE CLINIC | Facility: CLINIC | Age: 60
End: 2025-08-14
Payer: COMMERCIAL

## 2025-08-14 VITALS
DIASTOLIC BLOOD PRESSURE: 70 MMHG | HEIGHT: 61 IN | SYSTOLIC BLOOD PRESSURE: 130 MMHG | BODY MASS INDEX: 29.6 KG/M2 | RESPIRATION RATE: 16 BRPM | HEART RATE: 64 BPM | OXYGEN SATURATION: 99 % | WEIGHT: 156.8 LBS | TEMPERATURE: 97.7 F

## 2025-08-14 DIAGNOSIS — I10 ESSENTIAL HYPERTENSION: ICD-10-CM

## 2025-08-14 DIAGNOSIS — G45.9 TIA (TRANSIENT ISCHEMIC ATTACK): Primary | ICD-10-CM

## 2025-08-14 DIAGNOSIS — G45.9 TIA (TRANSIENT ISCHEMIC ATTACK): ICD-10-CM

## 2025-08-14 LAB
ALBUMIN SERPL-MCNC: 3.9 G/DL (ref 3.5–5)
ALBUMIN/GLOB SERPL: 1.2 (ref 1–1.9)
ALP SERPL-CCNC: 93 U/L (ref 35–104)
ALT SERPL-CCNC: 29 U/L (ref 8–45)
ANION GAP SERPL CALC-SCNC: 13 MMOL/L (ref 7–16)
AST SERPL-CCNC: 26 U/L (ref 15–37)
BASOPHILS # BLD: 0.04 K/UL (ref 0–0.2)
BASOPHILS NFR BLD: 0.8 % (ref 0–2)
BILIRUB SERPL-MCNC: 0.4 MG/DL (ref 0–1.2)
BILIRUBIN, URINE, POC: NEGATIVE
BLOOD URINE, POC: NEGATIVE
BUN SERPL-MCNC: 13 MG/DL (ref 6–23)
CALCIUM SERPL-MCNC: 9.7 MG/DL (ref 8.8–10.2)
CHLORIDE SERPL-SCNC: 103 MMOL/L (ref 98–107)
CO2 SERPL-SCNC: 26 MMOL/L (ref 20–29)
CREAT SERPL-MCNC: 0.66 MG/DL (ref 0.6–1.1)
DIFFERENTIAL METHOD BLD: ABNORMAL
EOSINOPHIL # BLD: 0.15 K/UL (ref 0–0.8)
EOSINOPHIL NFR BLD: 3.1 % (ref 0.5–7.8)
ERYTHROCYTE [DISTWIDTH] IN BLOOD BY AUTOMATED COUNT: 12.7 % (ref 11.9–14.6)
GLOBULIN SER CALC-MCNC: 3.2 G/DL (ref 2.3–3.5)
GLUCOSE SERPL-MCNC: 87 MG/DL (ref 70–99)
GLUCOSE URINE, POC: NEGATIVE
HCT VFR BLD AUTO: 40.3 % (ref 35.8–46.3)
HGB BLD-MCNC: 13.8 G/DL (ref 11.7–15.4)
IMM GRANULOCYTES # BLD AUTO: 0.02 K/UL (ref 0–0.5)
IMM GRANULOCYTES NFR BLD AUTO: 0.4 % (ref 0–5)
KETONES, URINE, POC: NEGATIVE
LEUKOCYTE ESTERASE, URINE, POC: NEGATIVE
LYMPHOCYTES # BLD: 1.31 K/UL (ref 0.5–4.6)
LYMPHOCYTES NFR BLD: 27 % (ref 13–44)
MCH RBC QN AUTO: 30 PG (ref 26.1–32.9)
MCHC RBC AUTO-ENTMCNC: 34.2 G/DL (ref 31.4–35)
MCV RBC AUTO: 87.6 FL (ref 82–102)
MONOCYTES # BLD: 0.36 K/UL (ref 0.1–1.3)
MONOCYTES NFR BLD: 7.4 % (ref 4–12)
NEUTS SEG # BLD: 2.98 K/UL (ref 1.7–8.2)
NEUTS SEG NFR BLD: 61.3 % (ref 43–78)
NITRITE, URINE, POC: NEGATIVE
NRBC # BLD: 0 K/UL (ref 0–0.2)
PH, URINE, POC: 6 (ref 4.6–8)
PLATELET # BLD AUTO: 257 K/UL (ref 150–450)
PMV BLD AUTO: 9.2 FL (ref 9.4–12.3)
POTASSIUM SERPL-SCNC: 3.8 MMOL/L (ref 3.5–5.1)
PROT SERPL-MCNC: 7.1 G/DL (ref 6.3–8.2)
PROTEIN,URINE, POC: NEGATIVE
RBC # BLD AUTO: 4.6 M/UL (ref 4.05–5.2)
SODIUM SERPL-SCNC: 143 MMOL/L (ref 136–145)
SPECIFIC GRAVITY, URINE, POC: 1.01 (ref 1–1.03)
TSH W FREE THYROID IF ABNORMAL: 3.3 UIU/ML (ref 0.27–4.2)
URINALYSIS CLARITY, POC: CLEAR
URINALYSIS COLOR, POC: YELLOW
UROBILINOGEN, POC: NORMAL
WBC # BLD AUTO: 4.9 K/UL (ref 4.3–11.1)

## 2025-08-14 PROCEDURE — 93000 ELECTROCARDIOGRAM COMPLETE: CPT | Performed by: FAMILY MEDICINE

## 2025-08-14 PROCEDURE — 99214 OFFICE O/P EST MOD 30 MIN: CPT | Performed by: FAMILY MEDICINE

## 2025-08-14 PROCEDURE — 3075F SYST BP GE 130 - 139MM HG: CPT | Performed by: FAMILY MEDICINE

## 2025-08-14 PROCEDURE — 81003 URINALYSIS AUTO W/O SCOPE: CPT | Performed by: FAMILY MEDICINE

## 2025-08-14 PROCEDURE — 3078F DIAST BP <80 MM HG: CPT | Performed by: FAMILY MEDICINE

## 2025-08-14 SDOH — ECONOMIC STABILITY: FOOD INSECURITY: WITHIN THE PAST 12 MONTHS, THE FOOD YOU BOUGHT JUST DIDN'T LAST AND YOU DIDN'T HAVE MONEY TO GET MORE.: NEVER TRUE

## 2025-08-14 SDOH — ECONOMIC STABILITY: FOOD INSECURITY: WITHIN THE PAST 12 MONTHS, YOU WORRIED THAT YOUR FOOD WOULD RUN OUT BEFORE YOU GOT MONEY TO BUY MORE.: NEVER TRUE

## 2025-08-15 ENCOUNTER — RESULTS FOLLOW-UP (OUTPATIENT)
Dept: FAMILY MEDICINE CLINIC | Facility: CLINIC | Age: 60
End: 2025-08-15

## 2025-08-15 ASSESSMENT — ENCOUNTER SYMPTOMS: SHORTNESS OF BREATH: 0

## 2025-08-28 ENCOUNTER — PATIENT MESSAGE (OUTPATIENT)
Dept: FAMILY MEDICINE CLINIC | Facility: CLINIC | Age: 60
End: 2025-08-28

## 2025-08-28 DIAGNOSIS — F40.240 CLAUSTROPHOBIA: Primary | ICD-10-CM

## 2025-08-28 RX ORDER — ALPRAZOLAM 0.5 MG
TABLET ORAL
Qty: 2 TABLET | Refills: 0 | Status: SHIPPED | OUTPATIENT
Start: 2025-08-28 | End: 2025-09-29

## 2025-08-29 ENCOUNTER — HOSPITAL ENCOUNTER (OUTPATIENT)
Dept: CT IMAGING | Age: 60
Discharge: HOME | End: 2025-08-31
Payer: COMMERCIAL

## 2025-08-29 DIAGNOSIS — G45.9 TIA (TRANSIENT ISCHEMIC ATTACK): ICD-10-CM

## 2025-08-29 PROCEDURE — 70496 CT ANGIOGRAPHY HEAD: CPT | Performed by: RADIOLOGY

## 2025-08-29 PROCEDURE — 70496 CT ANGIOGRAPHY HEAD: CPT

## 2025-08-29 PROCEDURE — 6360000004 HC RX CONTRAST MEDICATION: Performed by: FAMILY MEDICINE

## 2025-08-29 PROCEDURE — 70498 CT ANGIOGRAPHY NECK: CPT | Performed by: RADIOLOGY

## 2025-08-29 RX ORDER — IOPAMIDOL 755 MG/ML
60 INJECTION, SOLUTION INTRAVASCULAR
Status: COMPLETED | OUTPATIENT
Start: 2025-08-29 | End: 2025-08-29

## 2025-08-29 RX ADMIN — IOPAMIDOL 60 ML: 755 INJECTION, SOLUTION INTRAVENOUS at 08:20

## (undated) DEVICE — Device

## (undated) DEVICE — TROCAR: Brand: KII® SLEEVE

## (undated) DEVICE — SOLUTION IRRIG 3000ML 0.9% SOD CHL USP UROMATIC PLAS CONT

## (undated) DEVICE — LUKI TUBE SPECIMEN COLLECTION DEVICE,20 ML: Brand: ARGYLE

## (undated) DEVICE — TROCAR: Brand: KII FIOS FIRST ENTRY

## (undated) DEVICE — SOLUTION IRRIG 1000ML 0.9% SOD CHL USP POUR PLAS BTL

## (undated) DEVICE — PUMP SUC IRR TBNG L10FT W/ HNDPC ASSEMB STRYKEFLOW 2

## (undated) DEVICE — TUBING, SUCTION, 1/4" X 10', STRAIGHT: Brand: MEDLINE

## (undated) DEVICE — SPONGE LAP W18XL18IN WHT COT 4 PLY FLD STRUNG RADPQ DISP ST 2 PER PACK

## (undated) DEVICE — TUBING INSUFFLATION SMK EVAC HI FLO SET PNEUMOCLEAR

## (undated) DEVICE — DRAIN SURG 19FR 0.25IN SIL RND W/ TRCR INDIC DOT RADPQ FULL

## (undated) DEVICE — SPONGE GZ W4XL4IN COT 12 PLY TYP VII WVN C FLD DSGN STERILE

## (undated) DEVICE — GARMENT,MEDLINE,DVT,INT,CALF,LG, GEN2: Brand: MEDLINE

## (undated) DEVICE — PAD,ABDOMINAL,5"X9",ST,LF,25/BX: Brand: MEDLINE INDUSTRIES, INC.

## (undated) DEVICE — GLOVE SURG SZ 75 CRM LTX FREE POLYISOPRENE POLYMER BEAD ANTI

## (undated) DEVICE — RESERVOIR,SUCTION,100CC,SILICONE: Brand: MEDLINE

## (undated) DEVICE — SUTURE PDS II SZ 1 L96IN ABSRB VLT TP-1 L65MM 1/2 CIR Z880G

## (undated) DEVICE — SUTURE ETHILON SZ 2-0 L18IN NONABSORBABLE BLK L19MM PS-2 PRIM 593H

## (undated) DEVICE — INTENDED FOR TISSUE SEPARATION, AND OTHER PROCEDURES THAT REQUIRE A SHARP SURGICAL BLADE TO PUNCTURE OR CUT.: Brand: BARD-PARKER ® STAINLESS STEEL BLADES

## (undated) DEVICE — SUTURE VICRYL + SZ 0 L27IN ABSRB VLT L26MM UR-6 5/8 CIR VCP603H

## (undated) DEVICE — SUTURE MONOCRYL SZ 4-0 L27IN ABSRB UD L19MM PS-2 1/2 CIR PRIM Y426H

## (undated) DEVICE — LIQUIBAND RAPID ADHESIVE 36/CS 0.8ML: Brand: MEDLINE

## (undated) DEVICE — GENERAL LAPAROSCOPY: Brand: MEDLINE INDUSTRIES, INC.